# Patient Record
Sex: MALE | Race: BLACK OR AFRICAN AMERICAN | NOT HISPANIC OR LATINO
[De-identification: names, ages, dates, MRNs, and addresses within clinical notes are randomized per-mention and may not be internally consistent; named-entity substitution may affect disease eponyms.]

---

## 2017-02-17 ENCOUNTER — RECORD ABSTRACTING (OUTPATIENT)
Age: 57
End: 2017-02-17

## 2017-02-17 DIAGNOSIS — Z83.49 FAMILY HISTORY OF OTHER ENDOCRINE, NUTRITIONAL AND METABOLIC DISEASES: ICD-10-CM

## 2017-02-17 DIAGNOSIS — Z86.59 PERSONAL HISTORY OF OTHER MENTAL AND BEHAVIORAL DISORDERS: ICD-10-CM

## 2017-02-17 DIAGNOSIS — Z87.898 PERSONAL HISTORY OF OTHER SPECIFIED CONDITIONS: ICD-10-CM

## 2017-02-17 DIAGNOSIS — Z82.49 FAMILY HISTORY OF ISCHEMIC HEART DISEASE AND OTHER DISEASES OF THE CIRCULATORY SYSTEM: ICD-10-CM

## 2017-02-17 DIAGNOSIS — Z82.0 FAMILY HISTORY OF EPILEPSY AND OTHER DISEASES OF THE NERVOUS SYSTEM: ICD-10-CM

## 2017-02-17 DIAGNOSIS — Z83.3 FAMILY HISTORY OF DIABETES MELLITUS: ICD-10-CM

## 2017-02-17 DIAGNOSIS — Z86.2 PERSONAL HISTORY OF DISEASES OF THE BLOOD AND BLOOD-FORMING ORGANS AND CERTAIN DISORDERS INVOLVING THE IMMUNE MECHANISM: ICD-10-CM

## 2017-02-17 DIAGNOSIS — F43.10 POST-TRAUMATIC STRESS DISORDER, UNSPECIFIED: ICD-10-CM

## 2017-03-10 PROBLEM — Z00.00 ENCOUNTER FOR PREVENTIVE HEALTH EXAMINATION: Noted: 2017-03-10

## 2017-04-05 ENCOUNTER — APPOINTMENT (OUTPATIENT)
Dept: GASTROENTEROLOGY | Facility: CLINIC | Age: 57
End: 2017-04-05

## 2017-08-30 ENCOUNTER — APPOINTMENT (OUTPATIENT)
Dept: GASTROENTEROLOGY | Facility: CLINIC | Age: 57
End: 2017-08-30
Payer: MEDICAID

## 2017-08-30 VITALS
HEIGHT: 71 IN | WEIGHT: 185 LBS | RESPIRATION RATE: 14 BRPM | DIASTOLIC BLOOD PRESSURE: 98 MMHG | SYSTOLIC BLOOD PRESSURE: 229 MMHG | OXYGEN SATURATION: 98 % | HEART RATE: 78 BPM | BODY MASS INDEX: 25.9 KG/M2

## 2017-08-30 DIAGNOSIS — K43.9 VENTRAL HERNIA W/OUT OBSTRUCTION OR GANGRENE: ICD-10-CM

## 2017-08-30 DIAGNOSIS — Z86.711 PERSONAL HISTORY OF PULMONARY EMBOLISM: ICD-10-CM

## 2017-08-30 DIAGNOSIS — Z86.59 PERSONAL HISTORY OF OTHER MENTAL AND BEHAVIORAL DISORDERS: ICD-10-CM

## 2017-08-30 DIAGNOSIS — Z87.898 PERSONAL HISTORY OF OTHER SPECIFIED CONDITIONS: ICD-10-CM

## 2017-08-30 DIAGNOSIS — R07.89 OTHER CHEST PAIN: ICD-10-CM

## 2017-08-30 DIAGNOSIS — Z86.2 PERSONAL HISTORY OF DISEASES OF THE BLOOD AND BLOOD-FORMING ORGANS AND CERTAIN DISORDERS INVOLVING THE IMMUNE MECHANISM: ICD-10-CM

## 2017-08-30 DIAGNOSIS — Z87.438 PERSONAL HISTORY OF OTHER DISEASES OF MALE GENITAL ORGANS: ICD-10-CM

## 2017-08-30 PROCEDURE — 99214 OFFICE O/P EST MOD 30 MIN: CPT

## 2017-11-28 ENCOUNTER — APPOINTMENT (OUTPATIENT)
Dept: GASTROENTEROLOGY | Facility: GI CENTER | Age: 57
End: 2017-11-28

## 2018-04-25 ENCOUNTER — APPOINTMENT (OUTPATIENT)
Dept: GASTROENTEROLOGY | Facility: CLINIC | Age: 58
End: 2018-04-25

## 2018-06-26 ENCOUNTER — APPOINTMENT (OUTPATIENT)
Dept: GASTROENTEROLOGY | Facility: CLINIC | Age: 58
End: 2018-06-26
Payer: MEDICAID

## 2018-06-26 VITALS
DIASTOLIC BLOOD PRESSURE: 88 MMHG | RESPIRATION RATE: 16 BRPM | HEART RATE: 79 BPM | WEIGHT: 190 LBS | BODY MASS INDEX: 26.6 KG/M2 | SYSTOLIC BLOOD PRESSURE: 180 MMHG | HEIGHT: 71 IN | OXYGEN SATURATION: 99 %

## 2018-06-26 PROCEDURE — 99214 OFFICE O/P EST MOD 30 MIN: CPT

## 2018-06-26 RX ORDER — CIPROFLOXACIN HYDROCHLORIDE 500 MG/1
500 TABLET, FILM COATED ORAL
Qty: 20 | Refills: 0 | Status: COMPLETED | COMMUNITY
Start: 2018-06-12

## 2018-06-27 ENCOUNTER — LABORATORY RESULT (OUTPATIENT)
Age: 58
End: 2018-06-27

## 2018-06-27 ENCOUNTER — MEDICATION RENEWAL (OUTPATIENT)
Age: 58
End: 2018-06-27

## 2018-06-28 LAB
ALBUMIN SERPL ELPH-MCNC: 4.1 G/DL
ALP BLD-CCNC: 86 U/L
ALT SERPL-CCNC: 20 U/L
ANION GAP SERPL CALC-SCNC: 12 MMOL/L
AST SERPL-CCNC: 18 U/L
BASOPHILS # BLD AUTO: 0.1 K/UL
BASOPHILS NFR BLD AUTO: 2.7 %
BILIRUB SERPL-MCNC: 0.6 MG/DL
BUN SERPL-MCNC: 5 MG/DL
CALCIUM SERPL-MCNC: 8.7 MG/DL
CHLORIDE SERPL-SCNC: 105 MMOL/L
CO2 SERPL-SCNC: 25 MMOL/L
CREAT SERPL-MCNC: 0.97 MG/DL
EOSINOPHIL # BLD AUTO: 0.12 K/UL
EOSINOPHIL NFR BLD AUTO: 3.5 %
GLIADIN IGA SER QL: 5.8 UNITS
GLIADIN IGG SER QL: <5 UNITS
GLIADIN PEPTIDE IGA SER-ACNC: NEGATIVE
GLIADIN PEPTIDE IGG SER-ACNC: NEGATIVE
GLUCOSE SERPL-MCNC: 87 MG/DL
HCT VFR BLD CALC: 36.2 %
HGB BLD-MCNC: 11.8 G/DL
IGA SER QL IEP: 224 MG/DL
INR PPP: 1.09 RATIO
LYMPHOCYTES # BLD AUTO: 1.66 K/UL
LYMPHOCYTES NFR BLD AUTO: 46.9 %
MAN DIFF?: NORMAL
MCHC RBC-ENTMCNC: 22.9 PG
MCHC RBC-ENTMCNC: 32.6 GM/DL
MCV RBC AUTO: 70.2 FL
MONOCYTES # BLD AUTO: 0.28 K/UL
MONOCYTES NFR BLD AUTO: 8 %
NEUTROPHILS # BLD AUTO: 1.38 K/UL
NEUTROPHILS NFR BLD AUTO: 38.9 %
PLATELET # BLD AUTO: 160 K/UL
POTASSIUM SERPL-SCNC: 4.2 MMOL/L
PROT SERPL-MCNC: 6.5 G/DL
PT BLD: 12.3 SEC
RBC # BLD: 5.16 M/UL
RBC # FLD: 16.1 %
SODIUM SERPL-SCNC: 142 MMOL/L
TTG IGA SER IA-ACNC: 5.8 UNITS
TTG IGA SER-ACNC: NEGATIVE
TTG IGG SER IA-ACNC: <5 UNITS
TTG IGG SER IA-ACNC: NEGATIVE
WBC # FLD AUTO: 3.55 K/UL

## 2018-06-29 ENCOUNTER — OUTPATIENT (OUTPATIENT)
Dept: OUTPATIENT SERVICES | Facility: HOSPITAL | Age: 58
LOS: 1 days | End: 2018-06-29
Payer: COMMERCIAL

## 2018-06-29 ENCOUNTER — RESULT REVIEW (OUTPATIENT)
Age: 58
End: 2018-06-29

## 2018-06-29 ENCOUNTER — APPOINTMENT (OUTPATIENT)
Dept: GASTROENTEROLOGY | Facility: GI CENTER | Age: 58
End: 2018-06-29
Payer: MEDICAID

## 2018-06-29 DIAGNOSIS — R07.89 OTHER CHEST PAIN: ICD-10-CM

## 2018-06-29 DIAGNOSIS — S02.609A FRACTURE OF MANDIBLE, UNSPECIFIED, INITIAL ENCOUNTER FOR CLOSED FRACTURE: Chronic | ICD-10-CM

## 2018-06-29 DIAGNOSIS — Z98.89 OTHER SPECIFIED POSTPROCEDURAL STATES: Chronic | ICD-10-CM

## 2018-06-29 LAB
ENDOMYSIUM IGA SER QL: NEGATIVE
ENDOMYSIUM IGA TITR SER: NORMAL

## 2018-06-29 PROCEDURE — 88342 IMHCHEM/IMCYTCHM 1ST ANTB: CPT | Mod: 26

## 2018-06-29 PROCEDURE — 43239 EGD BIOPSY SINGLE/MULTIPLE: CPT

## 2018-06-29 PROCEDURE — 88305 TISSUE EXAM BY PATHOLOGIST: CPT | Mod: 26

## 2018-06-29 PROCEDURE — 88342 IMHCHEM/IMCYTCHM 1ST ANTB: CPT

## 2018-06-29 PROCEDURE — 88305 TISSUE EXAM BY PATHOLOGIST: CPT

## 2018-07-06 LAB — SURGICAL PATHOLOGY FINAL REPORT - CH: SIGNIFICANT CHANGE UP

## 2018-07-11 ENCOUNTER — APPOINTMENT (OUTPATIENT)
Dept: GASTROENTEROLOGY | Facility: GI CENTER | Age: 58
End: 2018-07-11
Payer: MEDICAID

## 2018-07-11 ENCOUNTER — OUTPATIENT (OUTPATIENT)
Dept: OUTPATIENT SERVICES | Facility: HOSPITAL | Age: 58
LOS: 1 days | End: 2018-07-11
Payer: COMMERCIAL

## 2018-07-11 DIAGNOSIS — Z12.11 ENCOUNTER FOR SCREENING FOR MALIGNANT NEOPLASM OF COLON: ICD-10-CM

## 2018-07-11 DIAGNOSIS — G89.29 LEFT UPPER QUADRANT PAIN: ICD-10-CM

## 2018-07-11 DIAGNOSIS — Z98.89 OTHER SPECIFIED POSTPROCEDURAL STATES: Chronic | ICD-10-CM

## 2018-07-11 DIAGNOSIS — S02.609A FRACTURE OF MANDIBLE, UNSPECIFIED, INITIAL ENCOUNTER FOR CLOSED FRACTURE: Chronic | ICD-10-CM

## 2018-07-11 DIAGNOSIS — R10.12 LEFT UPPER QUADRANT PAIN: ICD-10-CM

## 2018-07-11 PROCEDURE — G0121: CPT

## 2018-07-11 PROCEDURE — 45378 DIAGNOSTIC COLONOSCOPY: CPT | Mod: 33

## 2019-04-20 ENCOUNTER — EMERGENCY (EMERGENCY)
Facility: HOSPITAL | Age: 59
LOS: 1 days | Discharge: HOME | End: 2019-04-20
Attending: EMERGENCY MEDICINE
Payer: COMMERCIAL

## 2019-04-20 VITALS
WEIGHT: 179.9 LBS | HEART RATE: 73 BPM | RESPIRATION RATE: 18 BRPM | OXYGEN SATURATION: 98 % | HEIGHT: 71 IN | DIASTOLIC BLOOD PRESSURE: 80 MMHG | TEMPERATURE: 99 F | SYSTOLIC BLOOD PRESSURE: 131 MMHG

## 2019-04-20 DIAGNOSIS — S02.609A FRACTURE OF MANDIBLE, UNSPECIFIED, INITIAL ENCOUNTER FOR CLOSED FRACTURE: Chronic | ICD-10-CM

## 2019-04-20 DIAGNOSIS — Z98.89 OTHER SPECIFIED POSTPROCEDURAL STATES: Chronic | ICD-10-CM

## 2019-04-20 PROCEDURE — 73562 X-RAY EXAM OF KNEE 3: CPT | Mod: 26,RT

## 2019-04-20 PROCEDURE — 73562 X-RAY EXAM OF KNEE 3: CPT

## 2019-04-20 PROCEDURE — 99283 EMERGENCY DEPT VISIT LOW MDM: CPT

## 2019-04-20 RX ORDER — IBUPROFEN 200 MG
1 TABLET ORAL
Qty: 15 | Refills: 0
Start: 2019-04-20 | End: 2019-04-24

## 2019-04-20 RX ORDER — IBUPROFEN 200 MG
600 TABLET ORAL ONCE
Qty: 0 | Refills: 0 | Status: COMPLETED | OUTPATIENT
Start: 2019-04-20 | End: 2019-04-20

## 2019-04-20 RX ADMIN — Medication 600 MILLIGRAM(S): at 09:02
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
## 2019-04-20 NOTE — ED STATDOCS - PHYSICAL EXAMINATION
Musculoskeletal:  No swelling or deformity of right knee, tenderness to lateral aspect on palpation, no ligamentous laxity or crepitus
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
## 2020-06-29 ENCOUNTER — EMERGENCY (EMERGENCY)
Facility: HOSPITAL | Age: 60
LOS: 1 days | Discharge: HOME | End: 2020-06-29
Attending: EMERGENCY MEDICINE
Payer: COMMERCIAL

## 2020-06-29 VITALS
DIASTOLIC BLOOD PRESSURE: 90 MMHG | HEART RATE: 76 BPM | SYSTOLIC BLOOD PRESSURE: 135 MMHG | RESPIRATION RATE: 18 BRPM | OXYGEN SATURATION: 100 %

## 2020-06-29 VITALS
HEIGHT: 71 IN | TEMPERATURE: 98 F | WEIGHT: 184.97 LBS | SYSTOLIC BLOOD PRESSURE: 119 MMHG | DIASTOLIC BLOOD PRESSURE: 75 MMHG | HEART RATE: 80 BPM | OXYGEN SATURATION: 98 % | RESPIRATION RATE: 18 BRPM

## 2020-06-29 DIAGNOSIS — Z98.89 OTHER SPECIFIED POSTPROCEDURAL STATES: Chronic | ICD-10-CM

## 2020-06-29 DIAGNOSIS — S02.609A FRACTURE OF MANDIBLE, UNSPECIFIED, INITIAL ENCOUNTER FOR CLOSED FRACTURE: Chronic | ICD-10-CM

## 2020-06-29 LAB
ALBUMIN SERPL ELPH-MCNC: 4.4 G/DL — SIGNIFICANT CHANGE UP (ref 3.3–5.2)
ALP SERPL-CCNC: 72 U/L — SIGNIFICANT CHANGE UP (ref 40–120)
ALT FLD-CCNC: 22 U/L — SIGNIFICANT CHANGE UP
ANION GAP SERPL CALC-SCNC: 10 MMOL/L — SIGNIFICANT CHANGE UP (ref 5–17)
ANISOCYTOSIS BLD QL: SLIGHT — SIGNIFICANT CHANGE UP
AST SERPL-CCNC: 18 U/L — SIGNIFICANT CHANGE UP
BASOPHILS # BLD AUTO: 0.04 K/UL — SIGNIFICANT CHANGE UP (ref 0–0.2)
BASOPHILS NFR BLD AUTO: 0.8 % — SIGNIFICANT CHANGE UP (ref 0–2)
BILIRUB SERPL-MCNC: 0.9 MG/DL — SIGNIFICANT CHANGE UP (ref 0.4–2)
BUN SERPL-MCNC: 12 MG/DL — SIGNIFICANT CHANGE UP (ref 8–20)
CALCIUM SERPL-MCNC: 9.2 MG/DL — SIGNIFICANT CHANGE UP (ref 8.6–10.2)
CHLORIDE SERPL-SCNC: 102 MMOL/L — SIGNIFICANT CHANGE UP (ref 98–107)
CO2 SERPL-SCNC: 28 MMOL/L — SIGNIFICANT CHANGE UP (ref 22–29)
CREAT SERPL-MCNC: 0.93 MG/DL — SIGNIFICANT CHANGE UP (ref 0.5–1.3)
CRP SERPL-MCNC: 0.49 MG/DL — HIGH (ref 0–0.4)
EOSINOPHIL # BLD AUTO: 0.5 K/UL — SIGNIFICANT CHANGE UP (ref 0–0.5)
EOSINOPHIL NFR BLD AUTO: 10.4 % — HIGH (ref 0–6)
GLUCOSE SERPL-MCNC: 90 MG/DL — SIGNIFICANT CHANGE UP (ref 70–99)
HCT VFR BLD CALC: 41.5 % — SIGNIFICANT CHANGE UP (ref 39–50)
HGB BLD-MCNC: 12.9 G/DL — LOW (ref 13–17)
HYPOCHROMIA BLD QL: SLIGHT — SIGNIFICANT CHANGE UP
IMM GRANULOCYTES NFR BLD AUTO: 0.2 % — SIGNIFICANT CHANGE UP (ref 0–1.5)
LYMPHOCYTES # BLD AUTO: 0.98 K/UL — LOW (ref 1–3.3)
LYMPHOCYTES # BLD AUTO: 20.3 % — SIGNIFICANT CHANGE UP (ref 13–44)
MAGNESIUM SERPL-MCNC: 2 MG/DL — SIGNIFICANT CHANGE UP (ref 1.8–2.6)
MANUAL SMEAR VERIFICATION: SIGNIFICANT CHANGE UP
MCHC RBC-ENTMCNC: 23.3 PG — LOW (ref 27–34)
MCHC RBC-ENTMCNC: 31.1 GM/DL — LOW (ref 32–36)
MCV RBC AUTO: 74.9 FL — LOW (ref 80–100)
MICROCYTES BLD QL: SLIGHT — SIGNIFICANT CHANGE UP
MONOCYTES # BLD AUTO: 0.48 K/UL — SIGNIFICANT CHANGE UP (ref 0–0.9)
MONOCYTES NFR BLD AUTO: 10 % — SIGNIFICANT CHANGE UP (ref 2–14)
NEUTROPHILS # BLD AUTO: 2.81 K/UL — SIGNIFICANT CHANGE UP (ref 1.8–7.4)
NEUTROPHILS NFR BLD AUTO: 58.3 % — SIGNIFICANT CHANGE UP (ref 43–77)
OVALOCYTES BLD QL SMEAR: SLIGHT — SIGNIFICANT CHANGE UP
PHOSPHATE SERPL-MCNC: 3.7 MG/DL — SIGNIFICANT CHANGE UP (ref 2.4–4.7)
PLAT MORPH BLD: NORMAL — SIGNIFICANT CHANGE UP
PLATELET # BLD AUTO: 155 K/UL — SIGNIFICANT CHANGE UP (ref 150–400)
POIKILOCYTOSIS BLD QL AUTO: SLIGHT — SIGNIFICANT CHANGE UP
POLYCHROMASIA BLD QL SMEAR: SLIGHT — SIGNIFICANT CHANGE UP
POTASSIUM SERPL-MCNC: 4.2 MMOL/L — SIGNIFICANT CHANGE UP (ref 3.5–5.3)
POTASSIUM SERPL-SCNC: 4.2 MMOL/L — SIGNIFICANT CHANGE UP (ref 3.5–5.3)
PROT SERPL-MCNC: 7.7 G/DL — SIGNIFICANT CHANGE UP (ref 6.6–8.7)
RBC # BLD: 5.54 M/UL — SIGNIFICANT CHANGE UP (ref 4.2–5.8)
RBC # FLD: 16.5 % — HIGH (ref 10.3–14.5)
RBC BLD AUTO: ABNORMAL
SCHISTOCYTES BLD QL AUTO: SLIGHT — SIGNIFICANT CHANGE UP
SODIUM SERPL-SCNC: 140 MMOL/L — SIGNIFICANT CHANGE UP (ref 135–145)
T3 SERPL-MCNC: 117 NG/DL — SIGNIFICANT CHANGE UP (ref 80–200)
T4 AB SER-ACNC: 7.9 UG/DL — SIGNIFICANT CHANGE UP (ref 4.5–12)
TSH SERPL-MCNC: 1.15 UIU/ML — SIGNIFICANT CHANGE UP (ref 0.27–4.2)
WBC # BLD: 4.82 K/UL — SIGNIFICANT CHANGE UP (ref 3.8–10.5)
WBC # FLD AUTO: 4.82 K/UL — SIGNIFICANT CHANGE UP (ref 3.8–10.5)

## 2020-06-29 PROCEDURE — 86618 LYME DISEASE ANTIBODY: CPT

## 2020-06-29 PROCEDURE — 80053 COMPREHEN METABOLIC PANEL: CPT

## 2020-06-29 PROCEDURE — 86140 C-REACTIVE PROTEIN: CPT

## 2020-06-29 PROCEDURE — 86592 SYPHILIS TEST NON-TREP QUAL: CPT

## 2020-06-29 PROCEDURE — 87798 DETECT AGENT NOS DNA AMP: CPT

## 2020-06-29 PROCEDURE — 74177 CT ABD & PELVIS W/CONTRAST: CPT

## 2020-06-29 PROCEDURE — 86780 TREPONEMA PALLIDUM: CPT

## 2020-06-29 PROCEDURE — 94640 AIRWAY INHALATION TREATMENT: CPT

## 2020-06-29 PROCEDURE — 71260 CT THORAX DX C+: CPT

## 2020-06-29 PROCEDURE — 74177 CT ABD & PELVIS W/CONTRAST: CPT | Mod: 26

## 2020-06-29 PROCEDURE — 99284 EMERGENCY DEPT VISIT MOD MDM: CPT | Mod: 25

## 2020-06-29 PROCEDURE — U0003: CPT

## 2020-06-29 PROCEDURE — 84100 ASSAY OF PHOSPHORUS: CPT

## 2020-06-29 PROCEDURE — 84443 ASSAY THYROID STIM HORMONE: CPT

## 2020-06-29 PROCEDURE — 84436 ASSAY OF TOTAL THYROXINE: CPT

## 2020-06-29 PROCEDURE — 86038 ANTINUCLEAR ANTIBODIES: CPT

## 2020-06-29 PROCEDURE — 83735 ASSAY OF MAGNESIUM: CPT

## 2020-06-29 PROCEDURE — 85027 COMPLETE CBC AUTOMATED: CPT

## 2020-06-29 PROCEDURE — 36415 COLL VENOUS BLD VENIPUNCTURE: CPT

## 2020-06-29 PROCEDURE — 71260 CT THORAX DX C+: CPT | Mod: 26

## 2020-06-29 PROCEDURE — 84480 ASSAY TRIIODOTHYRONINE (T3): CPT

## 2020-06-29 PROCEDURE — 99285 EMERGENCY DEPT VISIT HI MDM: CPT

## 2020-06-29 RX ORDER — IPRATROPIUM/ALBUTEROL SULFATE 18-103MCG
3 AEROSOL WITH ADAPTER (GRAM) INHALATION ONCE
Refills: 0 | Status: COMPLETED | OUTPATIENT
Start: 2020-06-29 | End: 2020-06-29

## 2020-06-29 RX ADMIN — Medication 3 MILLILITER(S): at 14:36

## 2020-06-29 NOTE — ED PROVIDER NOTE - CARE PLAN
Principal Discharge DX:	Joint pain  Secondary Diagnosis:	Abdominal pain  Secondary Diagnosis:	Chest tightness

## 2020-06-29 NOTE — ED PROVIDER NOTE - CONSTITUTIONAL, MLM
normal... (+) ill appearing, having trouble processing thoughts. awake, alert, oriented to person, place, time/situation and in no apparent distress.

## 2020-06-29 NOTE — ED ADULT NURSE NOTE - OBJECTIVE STATEMENT
assumed care at 1430. 59 y/o male a&ox3 comes to ED c/o rib pain radiating to his back, and dull chest pain starting on Saturday. pt. states it worsens when he moves. pt. also states he has pain to his rt. middle digit starting yesterday, worsening today. radiating to his second digit. swelling noted to rt. hand. pt. unable to fully close his hand. pt. denies recent fall/injury. cocaine use last week
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
## 2020-06-29 NOTE — ED PROVIDER NOTE - ATTENDING CONTRIBUTION TO CARE
I, Ingrid Pillai, performed the initial face to face bedside interview with this patient regarding history of present illness, review of symptoms and relevant past medical, social and family history.  I completed an independent physical examination.  I was the initial provider who evaluated this patient. I have signed out the follow up of any pending tests (i.e. labs, radiological studies) to the ACP.  I have communicated the patient’s plan of care and disposition with the ACP.  The history, relevant review of systems, past medical and surgical history, medical decision making, and physical examination was documented by the scribe in my presence and I attest to the accuracy of the documentation.

## 2020-06-29 NOTE — ED PROVIDER NOTE - PATIENT PORTAL LINK FT
You can access the FollowMyHealth Patient Portal offered by [facility] by registering at the following website: [email or web address]. By joining [employer]’s FollowMyHealth portal, you will also be able to view your health information using other applications (apps) compatible with our system.

## 2020-06-29 NOTE — ED PROVIDER NOTE - SKIN, MLM
(+)Hyperpigmented spots on palms and soles of feet, (+) Digits are edematous. Skin normal color for race, warm, dry and intact.

## 2020-06-29 NOTE — ED PROVIDER NOTE - PROGRESS NOTE DETAILS
PT evaluated by intake physician. HPI/PE/ROS as noted above. Will follow up plan per intake physician. Pt also complaining of upper abd pain and ttp. Will order CT. Pt also admits to positive covid exposure. WIll send swab. Pt given all results. Pt told to f/u with PCP and to come back with new or worsening symptoms. COVID swabs obtained.  Advised home quarantine until test results.  If test positive, requires home quarantine for 14-days.  Anticipatory guidance provided and supportive care recommended. Advised immediate return if worsening symptoms, especially if short of breath. Pt given all results. Pt appears well. Pt told to f/u with PCP and to come back with new or worsening symptoms. PT evaluated by intake physician. HPI/PE/ROS as noted above. Will follow up plan per intake physician. Lungs clear. Pt also complaining of upper abd pain and ttp. Will order CT. Pt also admits to positive covid exposure. WIll send swab.

## 2020-06-29 NOTE — ED PROVIDER NOTE - NSFOLLOWUPINSTRUCTIONS_ED_ALL_ED_FT
Follow up with PCP.  Hospital will call with positive results.  Come back with new or worsening symptoms.    READ ALL ATTACHED INSTRUCTIONS FOR CORONAVIRUS IMMEDIATELY   -You were tested for COVID19 (Coronavirus) during your visit in the Emergency Department.   -Results will take 5-7 days  -Do NOT return to work/school/public areas until your COVID test results as negative.   -SELF QUARANTINE until COVID result is available.   -Avoid contact with others.   -Wash your hands frequently. Disinfect surfaces frequently    SEEK IMMEDIATE MEDICAL CARE IF YOU HAVE ANY OF THE FOLLOWING SYMPTOMS  **If you develop worsening or new symptoms such as shortness of breath, difficulty breathing, chest pain, confusion, severe weakness, or anything concerning to you, please seek immediate medical care or return to the ER .**

## 2020-06-30 LAB
B BURGDOR C6 AB SER-ACNC: POSITIVE
B BURGDOR IGG+IGM SER-ACNC: 2.15 INDEX — HIGH (ref 0.01–0.89)
CULTURE RESULTS: SIGNIFICANT CHANGE UP
SARS-COV-2 RNA SPEC QL NAA+PROBE: SIGNIFICANT CHANGE UP
SPECIMEN SOURCE: SIGNIFICANT CHANGE UP

## 2020-06-30 NOTE — ED POST DISCHARGE NOTE - DETAILS
+syphillis, letter sent spoke to patient will return for injection for treatment of syphillis, RX doxy+ for lyme, given phone number to duc CRAWFORD for follow up +BC babesosis, will need TX clinda and quinine, patient returning to ED

## 2020-07-01 LAB
RPR SERPL-ACNC: SIGNIFICANT CHANGE UP
T PALLIDUM AB TITR SER: POSITIVE
T PALLIDUM IGG SER QL IF: SIGNIFICANT CHANGE UP

## 2020-07-03 LAB — ANA TITR SER: NEGATIVE — SIGNIFICANT CHANGE UP

## 2020-07-06 ENCOUNTER — APPOINTMENT (OUTPATIENT)
Dept: FAMILY MEDICINE | Facility: CLINIC | Age: 60
End: 2020-07-06

## 2020-07-10 ENCOUNTER — EMERGENCY (EMERGENCY)
Facility: HOSPITAL | Age: 60
LOS: 1 days | Discharge: HOME | End: 2020-07-10
Attending: EMERGENCY MEDICINE
Payer: COMMERCIAL

## 2020-07-10 VITALS
DIASTOLIC BLOOD PRESSURE: 76 MMHG | OXYGEN SATURATION: 99 % | SYSTOLIC BLOOD PRESSURE: 124 MMHG | HEART RATE: 72 BPM | TEMPERATURE: 98 F | RESPIRATION RATE: 18 BRPM

## 2020-07-10 DIAGNOSIS — Z98.89 OTHER SPECIFIED POSTPROCEDURAL STATES: Chronic | ICD-10-CM

## 2020-07-10 DIAGNOSIS — S02.609A FRACTURE OF MANDIBLE, UNSPECIFIED, INITIAL ENCOUNTER FOR CLOSED FRACTURE: Chronic | ICD-10-CM

## 2020-07-10 PROCEDURE — 99283 EMERGENCY DEPT VISIT LOW MDM: CPT

## 2020-07-10 PROCEDURE — 99284 EMERGENCY DEPT VISIT MOD MDM: CPT

## 2020-07-10 RX ORDER — LACTOBACILLUS ACIDOPHILUS 100MM CELL
2 CAPSULE ORAL
Qty: 60 | Refills: 0
Start: 2020-07-10 | End: 2020-08-08

## 2020-07-10 RX ORDER — QUININE SULFATE 324 MG/1
2 CAPSULE ORAL
Qty: 60 | Refills: 0
Start: 2020-07-10 | End: 2020-07-19

## 2020-07-10 RX ORDER — PENICILLIN G BENZATHINE 1200000 [IU]/2ML
2.4 INJECTION, SUSPENSION INTRAMUSCULAR ONCE
Refills: 0 | Status: COMPLETED | OUTPATIENT
Start: 2020-07-10 | End: 2020-07-10

## 2020-07-10 RX ADMIN — PENICILLIN G BENZATHINE 2.4 MILLION UNIT(S): 1200000 INJECTION, SUSPENSION INTRAMUSCULAR at 15:09

## 2020-07-10 NOTE — ED PROVIDER NOTE - NS ED ROS FT
Constitutional: no fever, no chills, +fatigue  Eyes: no vision changes  ENT: no nasal congestion, no sore throat  CV: no chest pain  Resp: no cough, no shortness of breath  GI: no abdominal pain  : no dysuria  MSK: +joint pain  Skin: no rash  Neuro: no headache

## 2020-07-10 NOTE — ED PROVIDER NOTE - CLINICAL SUMMARY MEDICAL DECISION MAKING FREE TEXT BOX
60y M presenting for follow-up after testing positive for Lyme disease.  Rx for doxycycline sent.  Pt also with positive treponema Ab test, but negative RPR and FTA confirmatory test.  Unlikely to have active syphilis given test results, but will treat with Penicillin G IM.  Pt already has f/u appt scheduled with ID.

## 2020-07-10 NOTE — ED PROVIDER NOTE - OBJECTIVE STATEMENT
60y M w/ hx arthritis, enlarged prostate, pre-diabetic, PTSD, and thalassemia minor, presenting for follow-up of abnormal test result.  Pt was evaluated in this ED on 6/29, and tested positive for Lyme disease.  Also had positive Treponema antibody test, but negative RPR and FTA syphilis confirmatory test.  (Contrary to prior documentation, pt tested negative for babesiosis).  Denies any new complaints since last visit.  Has appt scheduled with Dr. Brito of ID on 7/21.  Notes that he has hx of STDs including chlamydia, and was treated with penicillin IM many years ago, though he is not sure why.

## 2020-07-10 NOTE — ED PROVIDER NOTE - ATTENDING CONTRIBUTION TO CARE
pt is call back for lyme disease and possibility of syphilis (although in reviewing lab results, unlikley to have active syphilis).  no new complaints  Pe lungs cta  abd soft plan abx

## 2020-07-10 NOTE — ED PROVIDER NOTE - CARE PROVIDER_API CALL
ELIZABETH DAHL  Infectious Diseases  [address] [place] [address]  Phone: [phone]  Fax: [phone]  Follow Up Time:

## 2020-07-10 NOTE — ED PROVIDER NOTE - NSFOLLOWUPINSTRUCTIONS_ED_ALL_ED_FT
TAKE ANTIBIOTIC (DOXYCYCLINE) AS PRESCRIBED.  FOLLOW-UP WITH INFECTIOUS DISEASE.  RETURN TO THE EMERGENCY ROOM FOR NEW OR WORSENING SYMPTOMS.    ---------------------------    Lyme Disease  Lyme disease is an infection that can affect many parts of the body, including the skin, joints, and nervous system. It is a bacterial infection that starts from the bite of an infected tick. Over time, the infection can worsen, and some of the symptoms are similar to the flu. If Lyme disease is not treated, it may cause joint pain, swelling, numbness, problems thinking, fatigue, muscle weakness, and other problems.  What are the causes?  This condition is caused by bacteria called Borrelia burgdorferi.  You can get Lyme disease by being bitten by an infected tick.Only black-legged, or Ixodes, ticks that are infected with the bacteria can cause Lyme disease.The tick must be attached to your skin for a certain period of time to pass along the infection. This is usually 36–48 hours.Deer often carry infected ticks.What increases the risk?  The following factors may make you more likely to develop this condition:  Living in or visiting these areas in the [place]  [place].Spending time in wooded or grassy areas.Being outdoors with exposed skin.Camping, gardening, hiking, fishing, hunting, or working outdoors.Failing to remove a tick from your skin.What are the signs or symptoms?  Symptoms of this condition may include:  Chills and fever.Headache.Fatigue.General achiness.Muscle pain.Joint pain, often in the knees.A round, red rash that surrounds the center of the tick bite. The center of the rash may be blood colored or have tiny blisters.Swollen lymph glands.Stiff neck.How is this diagnosed?  This condition is diagnosed based on:  Your symptoms and medical history.A physical exam.A blood test.How is this treated?  The main treatment for this condition is antibiotic medicine, which is usually taken by mouth (orally).  The length of treatment depends on how soon after a tick bite you begin taking the medicine. In some cases, treatment is necessary for several weeks.If the infection is severe, antibiotics may need to be given through an IV that is inserted into one of your veins.Follow these instructions at home:  Take over-the-counter and prescription medicines only as told by your health care provider. Finish all antibiotic medicine, even when you start to feel better.Ask your health care provider about taking a probiotic in between doses of your antibiotic to help avoid an upset stomach or diarrhea.Check with your health care provider before supplementing your treatment. Many alternative therapies have not been proven and may be harmful to you.Keep all follow-up visits as told by your health care provider. This is important.How is this prevented?  You can become reinfected if you get another tick bite from an infected tick. Take these steps to help prevent an infection:  Cover your skin with light-colored clothing when you are outdoors in the spring and summer months.Spray clothing and skin with bug spray. The spray should be 20–30% DEET. You can also treat clothing with permethrin, and let it dry before you wear it. Do not apply permethrin directly to your skin. Permethrin can also be used to treat camping gear and boots. Always read and follow the instructions that come with a bug spray or insecticide.Avoid wooded, grassy, and shaded areas.Remove yard litter, brush, trash, and plants that attract deer and rodents.Check yourself for ticks when you come indoors.Wash clothing worn each day.Shower after spending time outdoors.Check your pets for ticks before they come inside.If you find a tick attached to your skin:  Remove it with tweezers.Clean your hands and the bite area with rubbing alcohol or soap and water.Dispose of the tick by putting it in rubbing alcohol, putting it in a sealed bag or container, or flushing it down the toilet.You may choose to save the tick in a sealed container if you wish for it to be tested at a later time.Pregnant women should take special care to avoid tick bites because it is possible that the infection may be passed along to the fetus.  Contact a health care provider if:  You have symptoms after treatment.You have removed a tick and want to bring it to your health care provider for testing.Get help right away if:  You have an irregular heartbeat.You have chest pain.You have nerve pain.Your face feels numb.You develop the following:  A stiff neck.A severe headache.Severe nausea and vomiting.Sensitivity to light.Summary  Lyme disease is an infection that can affect many parts of the body, including the skin, joints, and nervous system.This condition is caused by bacteria called Borrelia burgdorferi.You can get Lyme disease by being bitten by an infected tick.The main treatment for this condition is antibiotic medicine.This information is not intended to replace advice given to you by your health care provider. Make sure you discuss any questions you have with your health care provider.    -----------------------------    Syphilis Test  Why am I having this test?  The syphilis test is used to diagnose syphilis and to help monitor syphilis treatment. Syphilis is a bacterial infection that commonly spreads through sexual contact. Syphilis may also spread to an unborn baby (fetus) through the blood of the mother.  You may have this test if you have symptoms of syphilis, such as a painless sore (chancre). Symptoms often look like the symptoms of many other conditions. As syphilis gets worse, early symptoms may go away before new symptoms develop. Untreated syphilis (late-stage syphilis) can lead to severe complications, including damage to the heart, brain, and nervous system.  Pregnant women often have a syphilis test. You may also have this test if you are at risk for syphilis because of:  Having a sexual partner with syphilis.Engaging in high-risk sexual activity.Having another STI (sexually transmitted infection), such as gonorrhea.What is being tested?  This test checks your blood for antibodies to the bacteria that cause syphilis. Antibodies are proteins that your body makes in response to germs and other things that can make you sick.  There are two types of blood tests to check for syphilis. Both tests are necessary to make a diagnosis:  Nontreponemal test. This is usually the first test. It can detect other kinds of antibodies as well and may result in a positive result for syphilis even if you do not have the condition (false positive).Treponemal test. This test is done if you get a positive result to the nontreponemal test. It tests specifically for antibodies to syphilis. Other conditions are not likely to cause a false positive. This test will not show whether antibodies are from a past syphilis infection or a current infection.What kind of sample is taken?     A blood sample is required for this test. It is usually collected by inserting a needle into a blood vessel.  If your health care provider thinks that you may have late-stage syphilis, you may have a lumbar puncture. This is a procedure in which a small sample of the fluid that surrounds the brain and spinal cord (cerebrospinal fluid or CSF) is removed to be examined for syphilis.  How do I prepare for this test?  Do not eat or drink anything other than water starting 8 hours before the test, or as told by your health care provider.Do not drink alcohol starting 24 hours before the test.Tell a health care provider about any medical conditions you have.How are the results reported?  Your results will be reported as positive or negative for syphilis antibodies.  What do the results mean?  If the result of your syphilis test is negative, no antibodies were present at the time of the test. This could mean:  You do not have syphilis.The antibodies have not formed yet. Antibodies can take several weeks to form. If it is possible that you were recently exposed to syphilis, you may need to have the test again at a later time.If you test positive for syphilis on the first nontreponemal test, you will most likely have the second treponemal test to confirm the diagnosis. If the result of the second test is also positive, it is likely that you have syphilis. If the result of the second test is negative, you may need more tests to make sure that you do not have syphilis.  Talk with your health care provider about what your results mean.  Questions to ask your health care provider  Ask your health care provider, or the department that is doing the test:  When will my results be ready?How will I get my results?What are my treatment options?What other tests do I need?What are my next steps?Summary  The syphilis test is used to diagnose syphilis and to help monitor syphilis treatment. This test checks your blood for antibodies to the bacteria that cause syphilis.There are two types of blood tests to check for syphilis. Both tests are necessary to make a diagnosis.Talk with your health care provider about what your results mean.This information is not intended to replace advice given to you by your health care provider. Make sure you discuss any questions you have with your health care provider.

## 2020-07-10 NOTE — ED PROVIDER NOTE - PHYSICAL EXAMINATION
Constitutional: Awake, alert, in no acute distress  Eyes: no scleral icterus  HENT: normocephalic, atraumatic, moist oral mucosa  CV: RRR, no murmur  Pulm: non-labored respirations, CTAB  Abdomen: soft, non-tender, non-distended  Extremities: no edema, no deformity  Skin: no rash, no jaundice  Neuro: AAOx3, moving all extremities equally

## 2020-07-10 NOTE — ED PROVIDER NOTE - PATIENT PORTAL LINK FT
You can access the FollowMyHealth Patient Portal offered by [facility] by registering at the following website: [email or web address]. By joining [employer]’s FollowMyHealth portal, you will also be able to view your health information using other applications (apps) compatible with our system.

## 2020-07-21 ENCOUNTER — APPOINTMENT (OUTPATIENT)
Dept: INTERNAL MEDICINE | Facility: CLINIC | Age: 60
End: 2020-07-21
Payer: MEDICAID

## 2020-07-21 VITALS
TEMPERATURE: 98 F | BODY MASS INDEX: 26.6 KG/M2 | WEIGHT: 190 LBS | HEIGHT: 71 IN | HEART RATE: 76 BPM | RESPIRATION RATE: 16 BRPM | SYSTOLIC BLOOD PRESSURE: 112 MMHG | DIASTOLIC BLOOD PRESSURE: 78 MMHG

## 2020-07-21 DIAGNOSIS — R76.8 OTHER SPECIFIED ABNORMAL IMMUNOLOGICAL FINDINGS IN SERUM: ICD-10-CM

## 2020-07-21 DIAGNOSIS — Z87.898 PERSONAL HISTORY OF OTHER SPECIFIED CONDITIONS: ICD-10-CM

## 2020-07-21 PROCEDURE — 36415 COLL VENOUS BLD VENIPUNCTURE: CPT

## 2020-07-21 PROCEDURE — 99204 OFFICE O/P NEW MOD 45 MIN: CPT | Mod: 25

## 2020-07-21 RX ORDER — POLYETHYLENE GLYOCOL 3350, SODIUM CHLORIDE, SODIUM BICARBONATE AND POTASSIUM CHLORIDE 420; 11.2; 5.72; 1.48 G/4L; G/4L; G/4L; G/4L
420 POWDER, FOR SOLUTION NASOGASTRIC; ORAL
Qty: 1 | Refills: 0 | Status: DISCONTINUED | COMMUNITY
Start: 2018-06-26 | End: 2020-07-21

## 2020-07-21 RX ORDER — LAMOTRIGINE 100 MG/1
100 TABLET ORAL
Qty: 30 | Refills: 0 | Status: DISCONTINUED | COMMUNITY
Start: 2017-06-09 | End: 2020-07-21

## 2020-07-21 RX ORDER — POLYETHYLENE GLYOCOL 3350, SODIUM CHLORIDE, SODIUM BICARBONATE AND POTASSIUM CHLORIDE 420; 11.2; 5.72; 1.48 G/4L; G/4L; G/4L; G/4L
420 POWDER, FOR SOLUTION NASOGASTRIC; ORAL
Qty: 1 | Refills: 0 | Status: DISCONTINUED | COMMUNITY
Start: 2018-06-27 | End: 2020-07-21

## 2020-07-21 RX ORDER — DESVENLAFAXINE 50 MG/1
50 TABLET, EXTENDED RELEASE ORAL
Qty: 30 | Refills: 0 | Status: DISCONTINUED | COMMUNITY
Start: 2017-07-24 | End: 2020-07-21

## 2020-08-11 LAB
ALBUMIN SERPL ELPH-MCNC: 4.4 G/DL
ALP BLD-CCNC: 90 U/L
ALT SERPL-CCNC: 25 U/L
ANION GAP SERPL CALC-SCNC: 11 MMOL/L
AST SERPL-CCNC: 17 U/L
B BURGDOR AB SER-IMP: NEGATIVE
B BURGDOR IGM PATRN SER IB-IMP: NEGATIVE
B BURGDOR18KD IGG SER QL IB: NORMAL
B BURGDOR23KD IGG SER QL IB: NORMAL
B BURGDOR23KD IGM SER QL IB: NORMAL
B BURGDOR28KD IGG SER QL IB: NORMAL
B BURGDOR30KD IGG SER QL IB: NORMAL
B BURGDOR31KD IGG SER QL IB: NORMAL
B BURGDOR39KD IGG SER QL IB: NORMAL
B BURGDOR39KD IGM SER QL IB: NORMAL
B BURGDOR41KD IGG SER QL IB: PRESENT
B BURGDOR41KD IGM SER QL IB: PRESENT
B BURGDOR45KD IGG SER QL IB: NORMAL
B BURGDOR58KD IGG SER QL IB: PRESENT
B BURGDOR66KD IGG SER QL IB: NORMAL
B BURGDOR93KD IGG SER QL IB: NORMAL
BASOPHILS # BLD AUTO: 0.02 K/UL
BASOPHILS NFR BLD AUTO: 0.6 %
BILIRUB SERPL-MCNC: 0.4 MG/DL
BUN SERPL-MCNC: 14 MG/DL
CALCIUM SERPL-MCNC: 9.4 MG/DL
CHLORIDE SERPL-SCNC: 105 MMOL/L
CO2 SERPL-SCNC: 25 MMOL/L
CREAT SERPL-MCNC: 1.01 MG/DL
EOSINOPHIL # BLD AUTO: 0.42 K/UL
EOSINOPHIL NFR BLD AUTO: 12.5 %
GLUCOSE SERPL-MCNC: 109 MG/DL
HCT VFR BLD CALC: 38.6 %
HGB BLD-MCNC: 12 G/DL
IMM GRANULOCYTES NFR BLD AUTO: 0 %
LYMPHOCYTES # BLD AUTO: 1 K/UL
LYMPHOCYTES NFR BLD AUTO: 29.7 %
MAN DIFF?: NORMAL
MCHC RBC-ENTMCNC: 23.1 PG
MCHC RBC-ENTMCNC: 31.1 GM/DL
MCV RBC AUTO: 74.4 FL
MONOCYTES # BLD AUTO: 0.29 K/UL
MONOCYTES NFR BLD AUTO: 8.6 %
NEUTROPHILS # BLD AUTO: 1.64 K/UL
NEUTROPHILS NFR BLD AUTO: 48.6 %
PLATELET # BLD AUTO: 188 K/UL
POTASSIUM SERPL-SCNC: 4.1 MMOL/L
PROT SERPL-MCNC: 6.8 G/DL
RBC # BLD: 5.19 M/UL
RBC # FLD: 16.4 %
SODIUM SERPL-SCNC: 142 MMOL/L
WBC # FLD AUTO: 3.37 K/UL

## 2020-08-12 ENCOUNTER — EMERGENCY (EMERGENCY)
Facility: HOSPITAL | Age: 60
LOS: 1 days | Discharge: HOME | End: 2020-08-12
Attending: EMERGENCY MEDICINE
Payer: COMMERCIAL

## 2020-08-12 VITALS
TEMPERATURE: 99 F | DIASTOLIC BLOOD PRESSURE: 90 MMHG | HEIGHT: 71 IN | WEIGHT: 184.97 LBS | RESPIRATION RATE: 18 BRPM | HEART RATE: 96 BPM | OXYGEN SATURATION: 98 % | SYSTOLIC BLOOD PRESSURE: 138 MMHG

## 2020-08-12 DIAGNOSIS — Z98.89 OTHER SPECIFIED POSTPROCEDURAL STATES: Chronic | ICD-10-CM

## 2020-08-12 DIAGNOSIS — S02.609A FRACTURE OF MANDIBLE, UNSPECIFIED, INITIAL ENCOUNTER FOR CLOSED FRACTURE: Chronic | ICD-10-CM

## 2020-08-12 PROCEDURE — 70450 CT HEAD/BRAIN W/O DYE: CPT

## 2020-08-12 PROCEDURE — 99284 EMERGENCY DEPT VISIT MOD MDM: CPT

## 2020-08-12 PROCEDURE — 71045 X-RAY EXAM CHEST 1 VIEW: CPT

## 2020-08-12 PROCEDURE — 72125 CT NECK SPINE W/O DYE: CPT

## 2020-08-12 PROCEDURE — 70450 CT HEAD/BRAIN W/O DYE: CPT | Mod: 26

## 2020-08-12 PROCEDURE — 71045 X-RAY EXAM CHEST 1 VIEW: CPT | Mod: 26

## 2020-08-12 PROCEDURE — 93010 ELECTROCARDIOGRAM REPORT: CPT

## 2020-08-12 PROCEDURE — 93005 ELECTROCARDIOGRAM TRACING: CPT

## 2020-08-12 PROCEDURE — 72125 CT NECK SPINE W/O DYE: CPT | Mod: 26

## 2020-08-12 PROCEDURE — 99285 EMERGENCY DEPT VISIT HI MDM: CPT

## 2020-08-12 RX ORDER — ACETAMINOPHEN 500 MG
650 TABLET ORAL ONCE
Refills: 0 | Status: COMPLETED | OUTPATIENT
Start: 2020-08-12 | End: 2020-08-12

## 2020-08-12 RX ADMIN — Medication 650 MILLIGRAM(S): at 09:12

## 2020-08-12 NOTE — ED PROVIDER NOTE - PROGRESS NOTE DETAILS
patient has no acute complaints, states he feels tired from "lack of sleep and stress." Patient's brother will come pick him up
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
## 2020-08-12 NOTE — ED PROVIDER NOTE - NS ED ROS FT
General: Denies fever, chills  HEENT: Denies sensory changes, sore throat  Neck: Denies neck pain. + neck stiffness  Resp: Denies coughing, SOB  Cardiovascular: Denies CP, palpitations, LE edema  GI: Denies nausea, vomiting. + abdominal pain. Denies  diarrhea, constipation, blood in stool  : Denies dysuria, hematuria, frequency, incontinence  MSK: Denies back pain  Neuro: Denies HA, dizziness, numbness, weakness  Skin: Denies rashes.

## 2020-08-12 NOTE — ED PROVIDER NOTE - PHYSICAL EXAMINATION
General: Well appearing male in no acute distress  HEENT: Normocephalic, atraumatic. Moist mucous membranes. Oropharynx clear. No lymphadenopathy.  Eyes: No scleral icterus. EOMI. CECILY.  Neck:. Soft and supple. Full ROM without pain. No midline tenderness. No paraspinal muscle tenderness.   Cardiac: Regular rate and regular rhythm. No murmurs, rubs, gallops. Peripheral pulses 2+ and symmetric. No LE edema.  Resp: Lungs CTAB. Speaking in full sentences. No wheezes, rales or rhonchi.  Abd: Soft, non-tender, non-distended. No guarding or rebound. No scars, masses, or lesions. No bruising or abrasions noted.   Back: Spine midline and non-tender. No CVA tenderness.    Skin: No rashes, abrasions, or lacerations.  Neuro: AO x 3. Moves all extremities symmetrically. Motor strength and sensation grossly intact. Cerebellar signs intact. Gait normal, non-ataxic.

## 2020-08-12 NOTE — ED PROVIDER NOTE - PATIENT PORTAL LINK FT
You can access the FollowMyHealth Patient Portal offered by [facility] by registering at the following website: [email or web address]. By joining [employer]’s FollowMyHealth portal, you will also be able to view your health information using other applications (apps) compatible with our system.

## 2020-08-12 NOTE — ED ADULT TRIAGE NOTE - CHIEF COMPLAINT QUOTE
Pt arrives with waking up feeling nauseous, dizzy, light headed and with left sided abdominal pain. Pt denies chest pain, SOB, fever/chills

## 2020-08-12 NOTE — ED ADULT NURSE NOTE - NSIMPLEMENTINTERV_GEN_ALL_ED
Implemented All Fall with Harm Risk Interventions:  [place] to call system. Call bell, personal items and telephone within reach. Instruct patient to call for assistance. Room bathroom lighting operational. Non-slip footwear when patient is off stretcher. Physically safe environment: no spills, clutter or unnecessary equipment. Stretcher in lowest position, wheels locked, appropriate side rails in place. Provide visual cue, wrist band, yellow gown, etc. Monitor gait and stability. Monitor for mental status changes and reorient to person, place, and time. Review medications for side effects contributing to fall risk. Reinforce activity limits and safety measures with patient and family. Provide visual clues: red socks.
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
## 2020-12-16 PROBLEM — Z12.11 ENCOUNTER FOR SCREENING COLONOSCOPY: Status: RESOLVED | Noted: 2018-06-26 | Resolved: 2020-12-16

## 2021-04-04 ENCOUNTER — EMERGENCY (EMERGENCY)
Facility: HOSPITAL | Age: 61
LOS: 1 days | Discharge: HOME | End: 2021-04-04
Attending: EMERGENCY MEDICINE
Payer: COMMERCIAL

## 2021-04-04 VITALS
DIASTOLIC BLOOD PRESSURE: 77 MMHG | SYSTOLIC BLOOD PRESSURE: 121 MMHG | TEMPERATURE: 99 F | HEIGHT: 71 IN | HEART RATE: 82 BPM | OXYGEN SATURATION: 99 % | RESPIRATION RATE: 18 BRPM | WEIGHT: 184.97 LBS

## 2021-04-04 DIAGNOSIS — Z98.89 OTHER SPECIFIED POSTPROCEDURAL STATES: Chronic | ICD-10-CM

## 2021-04-04 DIAGNOSIS — S02.609A FRACTURE OF MANDIBLE, UNSPECIFIED, INITIAL ENCOUNTER FOR CLOSED FRACTURE: Chronic | ICD-10-CM

## 2021-04-04 LAB
ALBUMIN SERPL ELPH-MCNC: 3.4 G/DL — SIGNIFICANT CHANGE UP (ref 3.3–5.2)
ALP SERPL-CCNC: 69 U/L — SIGNIFICANT CHANGE UP (ref 40–120)
ALT FLD-CCNC: 29 U/L — SIGNIFICANT CHANGE UP
ANION GAP SERPL CALC-SCNC: 12 MMOL/L — SIGNIFICANT CHANGE UP (ref 5–17)
ANISOCYTOSIS BLD QL: SLIGHT — SIGNIFICANT CHANGE UP
APPEARANCE UR: CLEAR — SIGNIFICANT CHANGE UP
AST SERPL-CCNC: 36 U/L — SIGNIFICANT CHANGE UP
AUER BODIES BLD QL SMEAR: PRESENT — SIGNIFICANT CHANGE UP
BACTERIA # UR AUTO: ABNORMAL
BASOPHILS # BLD AUTO: 0.08 K/UL — SIGNIFICANT CHANGE UP (ref 0–0.2)
BASOPHILS NFR BLD AUTO: 1.7 % — SIGNIFICANT CHANGE UP (ref 0–2)
BILIRUB SERPL-MCNC: 0.8 MG/DL — SIGNIFICANT CHANGE UP (ref 0.4–2)
BILIRUB UR-MCNC: NEGATIVE — SIGNIFICANT CHANGE UP
BUN SERPL-MCNC: 14 MG/DL — SIGNIFICANT CHANGE UP (ref 8–20)
CALCIUM SERPL-MCNC: 8.8 MG/DL — SIGNIFICANT CHANGE UP (ref 8.6–10.2)
CHLORIDE SERPL-SCNC: 103 MMOL/L — SIGNIFICANT CHANGE UP (ref 98–107)
CO2 SERPL-SCNC: 19 MMOL/L — LOW (ref 22–29)
COLOR SPEC: YELLOW — SIGNIFICANT CHANGE UP
COMMENT - URINE: SIGNIFICANT CHANGE UP
CREAT SERPL-MCNC: 0.95 MG/DL — SIGNIFICANT CHANGE UP (ref 0.5–1.3)
DIFF PNL FLD: ABNORMAL
ELLIPTOCYTES BLD QL SMEAR: SLIGHT — SIGNIFICANT CHANGE UP
EOSINOPHIL # BLD AUTO: 0.32 K/UL — SIGNIFICANT CHANGE UP (ref 0–0.5)
EOSINOPHIL NFR BLD AUTO: 7 % — HIGH (ref 0–6)
EPI CELLS # UR: SIGNIFICANT CHANGE UP
GIANT PLATELETS BLD QL SMEAR: PRESENT — SIGNIFICANT CHANGE UP
GLUCOSE SERPL-MCNC: 102 MG/DL — HIGH (ref 70–99)
GLUCOSE UR QL: NEGATIVE MG/DL — SIGNIFICANT CHANGE UP
HCT VFR BLD CALC: 42.8 % — SIGNIFICANT CHANGE UP (ref 39–50)
HGB BLD-MCNC: 13.5 G/DL — SIGNIFICANT CHANGE UP (ref 13–17)
HYALINE CASTS # UR AUTO: ABNORMAL /LPF
KETONES UR-MCNC: ABNORMAL
LEUKOCYTE ESTERASE UR-ACNC: NEGATIVE — SIGNIFICANT CHANGE UP
LYMPHOCYTES # BLD AUTO: 1.25 K/UL — SIGNIFICANT CHANGE UP (ref 1–3.3)
LYMPHOCYTES # BLD AUTO: 27.2 % — SIGNIFICANT CHANGE UP (ref 13–44)
MANUAL SMEAR VERIFICATION: SIGNIFICANT CHANGE UP
MCHC RBC-ENTMCNC: 22.9 PG — LOW (ref 27–34)
MCHC RBC-ENTMCNC: 31.5 GM/DL — LOW (ref 32–36)
MCV RBC AUTO: 72.7 FL — LOW (ref 80–100)
MICROCYTES BLD QL: SLIGHT — SIGNIFICANT CHANGE UP
MONOCYTES # BLD AUTO: 0.56 K/UL — SIGNIFICANT CHANGE UP (ref 0–0.9)
MONOCYTES NFR BLD AUTO: 12.3 % — SIGNIFICANT CHANGE UP (ref 2–14)
NEUTROPHILS # BLD AUTO: 2.3 K/UL — SIGNIFICANT CHANGE UP (ref 1.8–7.4)
NEUTROPHILS NFR BLD AUTO: 48.2 % — SIGNIFICANT CHANGE UP (ref 43–77)
NEUTS BAND # BLD: 1.8 % — SIGNIFICANT CHANGE UP (ref 0–8)
NITRITE UR-MCNC: NEGATIVE — SIGNIFICANT CHANGE UP
OVALOCYTES BLD QL SMEAR: SLIGHT — SIGNIFICANT CHANGE UP
PH UR: 5 — SIGNIFICANT CHANGE UP (ref 5–8)
PLAT MORPH BLD: NORMAL — SIGNIFICANT CHANGE UP
PLATELET # BLD AUTO: 170 K/UL — SIGNIFICANT CHANGE UP (ref 150–400)
POIKILOCYTOSIS BLD QL AUTO: SLIGHT — SIGNIFICANT CHANGE UP
POLYCHROMASIA BLD QL SMEAR: SLIGHT — SIGNIFICANT CHANGE UP
POTASSIUM SERPL-MCNC: 5 MMOL/L — SIGNIFICANT CHANGE UP (ref 3.5–5.3)
POTASSIUM SERPL-SCNC: 5 MMOL/L — SIGNIFICANT CHANGE UP (ref 3.5–5.3)
PROT SERPL-MCNC: 7.1 G/DL — SIGNIFICANT CHANGE UP (ref 6.6–8.7)
PROT UR-MCNC: 30 MG/DL
RBC # BLD: 5.89 M/UL — HIGH (ref 4.2–5.8)
RBC # FLD: 15.9 % — HIGH (ref 10.3–14.5)
RBC BLD AUTO: ABNORMAL
RBC CASTS # UR COMP ASSIST: SIGNIFICANT CHANGE UP /HPF (ref 0–4)
SARS-COV-2 RNA SPEC QL NAA+PROBE: SIGNIFICANT CHANGE UP
SODIUM SERPL-SCNC: 133 MMOL/L — LOW (ref 135–145)
SP GR SPEC: 1.02 — SIGNIFICANT CHANGE UP (ref 1.01–1.02)
UROBILINOGEN FLD QL: 1 MG/DL
VARIANT LYMPHS # BLD: 1.8 % — SIGNIFICANT CHANGE UP (ref 0–6)
WBC # BLD: 4.59 K/UL — SIGNIFICANT CHANGE UP (ref 3.8–10.5)
WBC # FLD AUTO: 4.59 K/UL — SIGNIFICANT CHANGE UP (ref 3.8–10.5)
WBC UR QL: SIGNIFICANT CHANGE UP

## 2021-04-04 PROCEDURE — 85025 COMPLETE CBC W/AUTO DIFF WBC: CPT

## 2021-04-04 PROCEDURE — U0003: CPT

## 2021-04-04 PROCEDURE — 36415 COLL VENOUS BLD VENIPUNCTURE: CPT

## 2021-04-04 PROCEDURE — 81001 URINALYSIS AUTO W/SCOPE: CPT

## 2021-04-04 PROCEDURE — 80053 COMPREHEN METABOLIC PANEL: CPT

## 2021-04-04 PROCEDURE — U0005: CPT

## 2021-04-04 PROCEDURE — 99283 EMERGENCY DEPT VISIT LOW MDM: CPT

## 2021-04-04 PROCEDURE — 93005 ELECTROCARDIOGRAM TRACING: CPT

## 2021-04-04 PROCEDURE — 93010 ELECTROCARDIOGRAM REPORT: CPT

## 2021-04-04 PROCEDURE — 99284 EMERGENCY DEPT VISIT MOD MDM: CPT

## 2021-04-04 NOTE — ED STATDOCS - ATTENDING CONTRIBUTION TO CARE
I, Kayla Langford, performed the initial face to face bedside interview with this patient regarding history of present illness, review of symptoms and relevant past medical, social and family history.  I completed an independent physical examination.  I was the initial provider who evaluated this patient. I have signed out the follow up of any pending tests (i.e. labs, radiological studies) to the ACP.  I have communicated the patient’s plan of care and disposition with the ACP.  The history, relevant review of systems, past medical and surgical history, medical decision making, and physical examination was documented by the scribe in my presence and I attest to the accuracy of the documentation.

## 2021-04-04 NOTE — ED STATDOCS - PROGRESS NOTE DETAILS
OBINNA PERALTA: PT evaluated by intake physician. HPI/PE/ROS as noted above. Will follow up plan per intake physician   medical clearance will email results of covid

## 2021-04-04 NOTE — ED STATDOCS - PATIENT PORTAL LINK FT
You can access the FollowMyHealth Patient Portal offered by [facility] by registering at the following website: [email or web address]. By joining [employer]’s FollowMyHealth portal, you will also be able to view your health information using other applications (apps) compatible with our system.

## 2021-04-04 NOTE — ED STATDOCS - NSFOLLOWUPINSTRUCTIONS_ED_ALL_ED_FT
please remember to bring all results with you to the [facility]  patient is medically cleared for discharge

## 2021-04-04 NOTE — ED STATDOCS - OBJECTIVE STATEMENT
61 y/o M with PMHx of Enlarged prostate, PTSD, and Thalassanemia presents to ED requesting medical clearance for admittance to [facility]. Pt notes cocaine abuse with last usage last night. Denies ETOH use.
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
## 2021-06-04 ENCOUNTER — APPOINTMENT (OUTPATIENT)
Dept: PULMONOLOGY | Facility: CLINIC | Age: 61
End: 2021-06-04
Payer: MEDICAID

## 2021-06-04 VITALS
SYSTOLIC BLOOD PRESSURE: 132 MMHG | HEART RATE: 67 BPM | WEIGHT: 158 LBS | BODY MASS INDEX: 22.04 KG/M2 | DIASTOLIC BLOOD PRESSURE: 84 MMHG | OXYGEN SATURATION: 98 %

## 2021-06-04 DIAGNOSIS — Z86.2 PERSONAL HISTORY OF DISEASES OF THE BLOOD AND BLOOD-FORMING ORGANS AND CERTAIN DISORDERS INVOLVING THE IMMUNE MECHANISM: ICD-10-CM

## 2021-06-04 PROCEDURE — 99072 ADDL SUPL MATRL&STAF TM PHE: CPT

## 2021-06-04 PROCEDURE — 99203 OFFICE O/P NEW LOW 30 MIN: CPT

## 2021-06-04 RX ORDER — DOXYCYCLINE HYCLATE 50 MG/1
CAPSULE ORAL
Refills: 0 | Status: DISCONTINUED | COMMUNITY
End: 2021-06-04

## 2021-06-04 NOTE — PHYSICAL EXAM
[No Acute Distress] : no acute distress [Normal Appearance] : normal appearance [No Neck Mass] : no neck mass [Normal Rate/Rhythm] : normal rate/rhythm [Normal S1, S2] : normal s1, s2 [No Murmurs] : no murmurs [No Resp Distress] : no resp distress [Clear to Auscultation Bilaterally] : clear to auscultation bilaterally [No Abnormalities] : no abnormalities [Benign] : benign [Normal Gait] : normal gait [No Clubbing] : no clubbing [No Cyanosis] : no cyanosis [No Edema] : no edema [Normal Color/ Pigmentation] : normal color/ pigmentation [No Focal Deficits] : no focal deficits [Oriented x3] : oriented x3 [Normal Affect] : normal affect [Low Lying Soft Palate] : low lying soft palate [Enlarged Base of the Tongue] : enlarged base of the tongue [Retrognathia] : retrognathia [III] : Mallampati Class: III

## 2021-06-04 NOTE — CONSULT LETTER
[Dear  ___] : Dear  [unfilled], [Consult Letter:] : I had the pleasure of evaluating your patient, [unfilled]. [Please see my note below.] : Please see my note below. [Consult Closing:] : Thank you very much for allowing me to participate in the care of this patient.  If you have any questions, please do not hesitate to contact me. [Sincerely,] : Sincerely, [DrJayant  ___] : Dr. SILVA

## 2021-06-10 ENCOUNTER — EMERGENCY (EMERGENCY)
Facility: HOSPITAL | Age: 61
LOS: 1 days | Discharge: HOME | End: 2021-06-10
Attending: STUDENT IN AN ORGANIZED HEALTH CARE EDUCATION/TRAINING PROGRAM
Payer: COMMERCIAL

## 2021-06-10 VITALS
SYSTOLIC BLOOD PRESSURE: 148 MMHG | HEIGHT: 71 IN | RESPIRATION RATE: 17 BRPM | HEART RATE: 111 BPM | TEMPERATURE: 98 F | OXYGEN SATURATION: 98 % | DIASTOLIC BLOOD PRESSURE: 103 MMHG

## 2021-06-10 DIAGNOSIS — Z98.89 OTHER SPECIFIED POSTPROCEDURAL STATES: Chronic | ICD-10-CM

## 2021-06-10 DIAGNOSIS — S02.609A FRACTURE OF MANDIBLE, UNSPECIFIED, INITIAL ENCOUNTER FOR CLOSED FRACTURE: Chronic | ICD-10-CM

## 2021-06-10 LAB
HCT VFR BLD CALC: 39.8 % — SIGNIFICANT CHANGE UP (ref 39–50)
HGB BLD-MCNC: 12.7 G/DL — LOW (ref 13–17)
MCHC RBC-ENTMCNC: 23.4 PG — LOW (ref 27–34)
MCHC RBC-ENTMCNC: 31.9 GM/DL — LOW (ref 32–36)
MCV RBC AUTO: 73.3 FL — LOW (ref 80–100)
PLATELET # BLD AUTO: 160 K/UL — SIGNIFICANT CHANGE UP (ref 150–400)
RBC # BLD: 5.43 M/UL — SIGNIFICANT CHANGE UP (ref 4.2–5.8)
RBC # FLD: 15.5 % — HIGH (ref 10.3–14.5)
WBC # BLD: 5.09 K/UL — SIGNIFICANT CHANGE UP (ref 3.8–10.5)
WBC # FLD AUTO: 5.09 K/UL — SIGNIFICANT CHANGE UP (ref 3.8–10.5)

## 2021-06-10 PROCEDURE — 99284 EMERGENCY DEPT VISIT MOD MDM: CPT | Mod: 25

## 2021-06-10 PROCEDURE — 85025 COMPLETE CBC W/AUTO DIFF WBC: CPT

## 2021-06-10 PROCEDURE — 93005 ELECTROCARDIOGRAM TRACING: CPT

## 2021-06-10 PROCEDURE — 73030 X-RAY EXAM OF SHOULDER: CPT | Mod: 26,LT

## 2021-06-10 PROCEDURE — 84484 ASSAY OF TROPONIN QUANT: CPT

## 2021-06-10 PROCEDURE — 83735 ASSAY OF MAGNESIUM: CPT

## 2021-06-10 PROCEDURE — 73030 X-RAY EXAM OF SHOULDER: CPT

## 2021-06-10 PROCEDURE — 83690 ASSAY OF LIPASE: CPT

## 2021-06-10 PROCEDURE — 71046 X-RAY EXAM CHEST 2 VIEWS: CPT

## 2021-06-10 PROCEDURE — 99285 EMERGENCY DEPT VISIT HI MDM: CPT

## 2021-06-10 PROCEDURE — 93010 ELECTROCARDIOGRAM REPORT: CPT

## 2021-06-10 PROCEDURE — 36415 COLL VENOUS BLD VENIPUNCTURE: CPT

## 2021-06-10 PROCEDURE — 80053 COMPREHEN METABOLIC PANEL: CPT

## 2021-06-10 PROCEDURE — 70450 CT HEAD/BRAIN W/O DYE: CPT

## 2021-06-10 PROCEDURE — 71046 X-RAY EXAM CHEST 2 VIEWS: CPT | Mod: 26
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
## 2021-06-10 NOTE — ED STATDOCS - PROGRESS NOTE DETAILS
Keturah MCLAUGHLIN for ED attending, Dr. Mead. 62 y/o male with PMHx of Enlarged prostate, PTSD, AND Thalassanemia presents to the ED c/o psychiatric evaluation. Patient did report of having chest pressure. Patient admits to the use of alcohol and Adderall use prior to visit. Will send to main for further evaluation.

## 2021-06-10 NOTE — ED PROVIDER NOTE - NSFOLLOWUPINSTRUCTIONS_ED_ALL_ED_FT
Shoulder Pain    Many things can cause shoulder pain, including:    An injury to the area.  Overuse of the shoulder.  Arthritis.    The source of the pain can be:    Inflammation.  An injury to the shoulder joint.  An injury to a tendon, ligament, or bone.    Follow these instructions at home:  Take these actions to help with your pain:     Squeeze a soft ball or a foam pad as much as possible. This helps to keep the shoulder from swelling. It also helps to strengthen the arm.  Take over-the-counter and prescription medicines only as told by your health care provider.  If directed, apply ice to the area:  Put ice in a plastic bag.  Place a towel between your skin and the bag.  Leave the ice on for 20 minutes, 2–3 times per day. Stop applying ice if it does not help with the pain.  If you were given a shoulder sling or immobilizer:  Wear it as told.  Remove it to shower or bathe.  Move your arm as little as possible, but keep your hand moving to prevent swelling.    Contact a health care provider if:  Your pain gets worse.  Your pain is not relieved with medicines.  New pain develops in your arm, hand, or fingers.    Get help right away if:  Your arm, hand, or fingers:  Tingle.  Become numb.  Become swollen.  Become painful.  Turn white or blue.    ADDITIONAL NOTES AND INSTRUCTIONS    -Please follow-up with your primary care doctor in the next 2 days.  Please call tomorrow for an appointment.  If you cannot follow-up with your primary care doctor please return to the ED for any urgent issues.  - You were given a copy of the tests performed today.  Please bring the results with you and review them with your primary care doctor.  - If you have any worsening of symptoms or any other concerns please return to the ED immediately.  - Please continue taking your home medications as directed.

## 2021-06-10 NOTE — ED ADULT TRIAGE NOTE - CHIEF COMPLAINT QUOTE
requesting to speak with social work/psychiatry s/p "witnessing some things today". pt refusing to elaborate in triage. denies SI/HI. +etoh.

## 2021-06-10 NOTE — ED ADULT NURSE NOTE - NS ED NURSE RECORD ANOTHER VITAL SIGN
Yes Bexarotene Counseling:  I discussed with the patient the risks of bexarotene including but not limited to hair loss, dry lips/skin/eyes, liver abnormalities, hyperlipidemia, pancreatitis, depression/suicidal ideation, photosensitivity, drug rash/allergic reactions, hypothyroidism, anemia, leukopenia, infection, cataracts, and teratogenicity.  Patient understands that they will need regular blood tests to check lipid profile, liver function tests, white blood cell count, thyroid function tests and pregnancy test if applicable.

## 2021-06-10 NOTE — ED PROVIDER NOTE - CLINICAL SUMMARY MEDICAL DECISION MAKING FREE TEXT BOX
60y/o M with PMHx of PTSD, Thalassanemia presents to the ED c/o fatigue. 62y/o M with PMHx of PTSD, Thalassanemia presents to the ED c/o fatigue. Pt with labs and CTH wnl, pt instructed to follow up with PMD and therapist, given ortho follow up for chronic shoulder pain, ekg wnl no acute ischemic changes, cxr and left shoulder xray no acute findings, stable for dc with follow up

## 2021-06-10 NOTE — ED PROVIDER NOTE - PHYSICAL EXAMINATION
A&Ox3, moving all extremities symmetrically, follows commands, motor lars upper and lower ext 5/5, sensory symm and intact CN 2-12 grossly intact, no ataxia, no nystagmus, no dysmetria, no ddk, symm lars, no pronator drift

## 2021-06-10 NOTE — ED PROVIDER NOTE - PATIENT PORTAL LINK FT
You can access the FollowMyHealth Patient Portal offered by [facility] by registering at the following website: [email or web address]. By joining [employer]’s FollowMyHealth portal, you will also be able to view your health information using other applications (apps) compatible with our system.
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
## 2021-06-11 VITALS
HEART RATE: 88 BPM | OXYGEN SATURATION: 98 % | TEMPERATURE: 98 F | RESPIRATION RATE: 15 BRPM | DIASTOLIC BLOOD PRESSURE: 71 MMHG | SYSTOLIC BLOOD PRESSURE: 127 MMHG

## 2021-06-11 LAB
ALBUMIN SERPL ELPH-MCNC: 4.3 G/DL — SIGNIFICANT CHANGE UP (ref 3.3–5.2)
ALP SERPL-CCNC: 59 U/L — SIGNIFICANT CHANGE UP (ref 40–120)
ALT FLD-CCNC: 23 U/L — SIGNIFICANT CHANGE UP
ANION GAP SERPL CALC-SCNC: 10 MMOL/L — SIGNIFICANT CHANGE UP (ref 5–17)
AST SERPL-CCNC: 26 U/L — SIGNIFICANT CHANGE UP
BASOPHILS # BLD AUTO: 0.13 K/UL — SIGNIFICANT CHANGE UP (ref 0–0.2)
BASOPHILS NFR BLD AUTO: 2.6 % — HIGH (ref 0–2)
BILIRUB SERPL-MCNC: 1.5 MG/DL — SIGNIFICANT CHANGE UP (ref 0.4–2)
BUN SERPL-MCNC: 13.7 MG/DL — SIGNIFICANT CHANGE UP (ref 8–20)
CALCIUM SERPL-MCNC: 9.1 MG/DL — SIGNIFICANT CHANGE UP (ref 8.6–10.2)
CHLORIDE SERPL-SCNC: 100 MMOL/L — SIGNIFICANT CHANGE UP (ref 98–107)
CO2 SERPL-SCNC: 27 MMOL/L — SIGNIFICANT CHANGE UP (ref 22–29)
CREAT SERPL-MCNC: 0.93 MG/DL — SIGNIFICANT CHANGE UP (ref 0.5–1.3)
EOSINOPHIL # BLD AUTO: 0.18 K/UL — SIGNIFICANT CHANGE UP (ref 0–0.5)
EOSINOPHIL NFR BLD AUTO: 3.5 % — SIGNIFICANT CHANGE UP (ref 0–6)
GIANT PLATELETS BLD QL SMEAR: PRESENT — SIGNIFICANT CHANGE UP
GLUCOSE SERPL-MCNC: 83 MG/DL — SIGNIFICANT CHANGE UP (ref 70–99)
LIDOCAIN IGE QN: 24 U/L — SIGNIFICANT CHANGE UP (ref 22–51)
LYMPHOCYTES # BLD AUTO: 1.74 K/UL — SIGNIFICANT CHANGE UP (ref 1–3.3)
LYMPHOCYTES # BLD AUTO: 34.2 % — SIGNIFICANT CHANGE UP (ref 13–44)
MAGNESIUM SERPL-MCNC: 1.8 MG/DL — SIGNIFICANT CHANGE UP (ref 1.6–2.6)
MANUAL SMEAR VERIFICATION: SIGNIFICANT CHANGE UP
METAMYELOCYTES # FLD: 0.9 % — HIGH (ref 0–0)
MONOCYTES # BLD AUTO: 0.4 K/UL — SIGNIFICANT CHANGE UP (ref 0–0.9)
MONOCYTES NFR BLD AUTO: 7.9 % — SIGNIFICANT CHANGE UP (ref 2–14)
NEUTROPHILS # BLD AUTO: 2.5 K/UL — SIGNIFICANT CHANGE UP (ref 1.8–7.4)
NEUTROPHILS NFR BLD AUTO: 47.4 % — SIGNIFICANT CHANGE UP (ref 43–77)
NEUTS BAND # BLD: 1.7 % — SIGNIFICANT CHANGE UP (ref 0–8)
OVALOCYTES BLD QL SMEAR: SLIGHT — SIGNIFICANT CHANGE UP
PLAT MORPH BLD: NORMAL — SIGNIFICANT CHANGE UP
POIKILOCYTOSIS BLD QL AUTO: SLIGHT — SIGNIFICANT CHANGE UP
POLYCHROMASIA BLD QL SMEAR: SLIGHT — SIGNIFICANT CHANGE UP
POTASSIUM SERPL-MCNC: 3.9 MMOL/L — SIGNIFICANT CHANGE UP (ref 3.5–5.3)
POTASSIUM SERPL-SCNC: 3.9 MMOL/L — SIGNIFICANT CHANGE UP (ref 3.5–5.3)
PROT SERPL-MCNC: 7.3 G/DL — SIGNIFICANT CHANGE UP (ref 6.6–8.7)
RBC BLD AUTO: ABNORMAL
SMUDGE CELLS # BLD: PRESENT — SIGNIFICANT CHANGE UP
SODIUM SERPL-SCNC: 137 MMOL/L — SIGNIFICANT CHANGE UP (ref 135–145)
TROPONIN T SERPL-MCNC: <0.01 NG/ML — SIGNIFICANT CHANGE UP (ref 0–0.06)
VARIANT LYMPHS # BLD: 1.8 % — SIGNIFICANT CHANGE UP (ref 0–6)

## 2021-06-11 PROCEDURE — 70450 CT HEAD/BRAIN W/O DYE: CPT | Mod: 26,MA

## 2021-06-21 NOTE — ED PROVIDER NOTE - EYES, MLM
Clear bilaterally, pupils equal, round and reactive to light. [Initial] : an initial visit [Family Member] : family member [TextBox_44] : COPD, ?JOSESITO, Allergies, GERD, lung CA screening

## 2021-07-04 ENCOUNTER — EMERGENCY (EMERGENCY)
Facility: HOSPITAL | Age: 61
LOS: 1 days | Discharge: HOME | End: 2021-07-04
Attending: EMERGENCY MEDICINE
Payer: COMMERCIAL

## 2021-07-04 VITALS
DIASTOLIC BLOOD PRESSURE: 49 MMHG | OXYGEN SATURATION: 95 % | RESPIRATION RATE: 22 BRPM | HEART RATE: 125 BPM | TEMPERATURE: 98 F | HEIGHT: 71 IN | WEIGHT: 186.07 LBS | SYSTOLIC BLOOD PRESSURE: 93 MMHG

## 2021-07-04 DIAGNOSIS — Z98.89 OTHER SPECIFIED POSTPROCEDURAL STATES: Chronic | ICD-10-CM

## 2021-07-04 DIAGNOSIS — S02.609A FRACTURE OF MANDIBLE, UNSPECIFIED, INITIAL ENCOUNTER FOR CLOSED FRACTURE: Chronic | ICD-10-CM

## 2021-07-04 LAB
ALBUMIN SERPL ELPH-MCNC: 3.7 G/DL — SIGNIFICANT CHANGE UP (ref 3.3–5.2)
ALP SERPL-CCNC: 83 U/L — SIGNIFICANT CHANGE UP (ref 40–120)
ALT FLD-CCNC: 26 U/L — SIGNIFICANT CHANGE UP
ANION GAP SERPL CALC-SCNC: 10 MMOL/L — SIGNIFICANT CHANGE UP (ref 5–17)
APTT BLD: 29.1 SEC — SIGNIFICANT CHANGE UP (ref 27.5–35.5)
AST SERPL-CCNC: 33 U/L — SIGNIFICANT CHANGE UP
BASOPHILS # BLD AUTO: 0.02 K/UL — SIGNIFICANT CHANGE UP (ref 0–0.2)
BASOPHILS NFR BLD AUTO: 0.5 % — SIGNIFICANT CHANGE UP (ref 0–2)
BILIRUB SERPL-MCNC: 0.4 MG/DL — SIGNIFICANT CHANGE UP (ref 0.4–2)
BUN SERPL-MCNC: 15 MG/DL — SIGNIFICANT CHANGE UP (ref 8–20)
CALCIUM SERPL-MCNC: 8.5 MG/DL — LOW (ref 8.6–10.2)
CHLORIDE SERPL-SCNC: 104 MMOL/L — SIGNIFICANT CHANGE UP (ref 98–107)
CO2 SERPL-SCNC: 24 MMOL/L — SIGNIFICANT CHANGE UP (ref 22–29)
CREAT SERPL-MCNC: 1.06 MG/DL — SIGNIFICANT CHANGE UP (ref 0.5–1.3)
EOSINOPHIL # BLD AUTO: 0.21 K/UL — SIGNIFICANT CHANGE UP (ref 0–0.5)
EOSINOPHIL NFR BLD AUTO: 5.2 % — SIGNIFICANT CHANGE UP (ref 0–6)
GLUCOSE SERPL-MCNC: 77 MG/DL — SIGNIFICANT CHANGE UP (ref 70–99)
HCT VFR BLD CALC: 37.2 % — LOW (ref 39–50)
HGB BLD-MCNC: 12 G/DL — LOW (ref 13–17)
IMM GRANULOCYTES NFR BLD AUTO: 0 % — SIGNIFICANT CHANGE UP (ref 0–1.5)
INR BLD: 1.12 RATIO — SIGNIFICANT CHANGE UP (ref 0.88–1.16)
LYMPHOCYTES # BLD AUTO: 1.11 K/UL — SIGNIFICANT CHANGE UP (ref 1–3.3)
LYMPHOCYTES # BLD AUTO: 27.3 % — SIGNIFICANT CHANGE UP (ref 13–44)
MAGNESIUM SERPL-MCNC: 2 MG/DL — SIGNIFICANT CHANGE UP (ref 1.8–2.6)
MCHC RBC-ENTMCNC: 23.6 PG — LOW (ref 27–34)
MCHC RBC-ENTMCNC: 32.3 GM/DL — SIGNIFICANT CHANGE UP (ref 32–36)
MCV RBC AUTO: 73.1 FL — LOW (ref 80–100)
MONOCYTES # BLD AUTO: 0.46 K/UL — SIGNIFICANT CHANGE UP (ref 0–0.9)
MONOCYTES NFR BLD AUTO: 11.3 % — SIGNIFICANT CHANGE UP (ref 2–14)
NEUTROPHILS # BLD AUTO: 2.26 K/UL — SIGNIFICANT CHANGE UP (ref 1.8–7.4)
NEUTROPHILS NFR BLD AUTO: 55.7 % — SIGNIFICANT CHANGE UP (ref 43–77)
NT-PROBNP SERPL-SCNC: 66 PG/ML — SIGNIFICANT CHANGE UP (ref 0–300)
PLATELET # BLD AUTO: 181 K/UL — SIGNIFICANT CHANGE UP (ref 150–400)
POTASSIUM SERPL-MCNC: 5.6 MMOL/L — HIGH (ref 3.5–5.3)
POTASSIUM SERPL-SCNC: 5.6 MMOL/L — HIGH (ref 3.5–5.3)
PROT SERPL-MCNC: 6.6 G/DL — SIGNIFICANT CHANGE UP (ref 6.6–8.7)
PROTHROM AB SERPL-ACNC: 12.9 SEC — SIGNIFICANT CHANGE UP (ref 10.6–13.6)
RBC # BLD: 5.09 M/UL — SIGNIFICANT CHANGE UP (ref 4.2–5.8)
RBC # FLD: 15.9 % — HIGH (ref 10.3–14.5)
SODIUM SERPL-SCNC: 138 MMOL/L — SIGNIFICANT CHANGE UP (ref 135–145)
TROPONIN T SERPL-MCNC: <0.01 NG/ML — SIGNIFICANT CHANGE UP (ref 0–0.06)
TROPONIN T SERPL-MCNC: <0.01 NG/ML — SIGNIFICANT CHANGE UP (ref 0–0.06)
WBC # BLD: 4.06 K/UL — SIGNIFICANT CHANGE UP (ref 3.8–10.5)
WBC # FLD AUTO: 4.06 K/UL — SIGNIFICANT CHANGE UP (ref 3.8–10.5)

## 2021-07-04 PROCEDURE — 71275 CT ANGIOGRAPHY CHEST: CPT | Mod: 26

## 2021-07-04 PROCEDURE — 99254 IP/OBS CNSLTJ NEW/EST MOD 60: CPT

## 2021-07-04 PROCEDURE — 74174 CTA ABD&PLVS W/CONTRAST: CPT | Mod: 26,MG

## 2021-07-04 PROCEDURE — 93010 ELECTROCARDIOGRAM REPORT: CPT

## 2021-07-04 PROCEDURE — 99291 CRITICAL CARE FIRST HOUR: CPT

## 2021-07-04 PROCEDURE — 99283 EMERGENCY DEPT VISIT LOW MDM: CPT

## 2021-07-04 PROCEDURE — G1004: CPT

## 2021-07-04 PROCEDURE — 71045 X-RAY EXAM CHEST 1 VIEW: CPT | Mod: 26

## 2021-07-04 RX ORDER — SODIUM CHLORIDE 9 MG/ML
1000 INJECTION INTRAMUSCULAR; INTRAVENOUS; SUBCUTANEOUS ONCE
Refills: 0 | Status: COMPLETED | OUTPATIENT
Start: 2021-07-04 | End: 2021-07-04

## 2021-07-04 RX ORDER — ACETAMINOPHEN 500 MG
975 TABLET ORAL ONCE
Refills: 0 | Status: COMPLETED | OUTPATIENT
Start: 2021-07-04 | End: 2021-07-04

## 2021-07-04 RX ORDER — FOLIC ACID 0.8 MG
1 TABLET ORAL DAILY
Refills: 0 | Status: DISCONTINUED | OUTPATIENT
Start: 2021-07-04 | End: 2021-07-09

## 2021-07-04 RX ORDER — ADENOSINE 3 MG/ML
6 INJECTION INTRAVENOUS ONCE
Refills: 0 | Status: COMPLETED | OUTPATIENT
Start: 2021-07-04 | End: 2021-07-04

## 2021-07-04 RX ORDER — TAMSULOSIN HYDROCHLORIDE 0.4 MG/1
0.8 CAPSULE ORAL DAILY
Refills: 0 | Status: DISCONTINUED | OUTPATIENT
Start: 2021-07-04 | End: 2021-07-09

## 2021-07-04 RX ORDER — RISPERIDONE 4 MG/1
1 TABLET ORAL AT BEDTIME
Refills: 0 | Status: DISCONTINUED | OUTPATIENT
Start: 2021-07-04 | End: 2021-07-09

## 2021-07-04 RX ADMIN — Medication 975 MILLIGRAM(S): at 17:46

## 2021-07-04 RX ADMIN — SODIUM CHLORIDE 1000 MILLILITER(S): 9 INJECTION INTRAMUSCULAR; INTRAVENOUS; SUBCUTANEOUS at 15:19

## 2021-07-04 RX ADMIN — SODIUM CHLORIDE 1000 MILLILITER(S): 9 INJECTION INTRAMUSCULAR; INTRAVENOUS; SUBCUTANEOUS at 15:06

## 2021-07-04 RX ADMIN — RISPERIDONE 1 MILLIGRAM(S): 4 TABLET ORAL at 22:29

## 2021-07-04 RX ADMIN — ADENOSINE 6 MILLIGRAM(S): 3 INJECTION INTRAVENOUS at 17:00

## 2021-07-04 NOTE — ED ADULT NURSE NOTE - PMH
prn         Luci Keller MD Enlarged prostate    PE (pulmonary thromboembolism)    Post traumatic stress disorder    Thalassanemia, minor

## 2021-07-04 NOTE — ED PROCEDURE NOTE - PROCEDURE ADDITIONAL DETAILS
Educational Bedside U/S performed, no significant pericardial effusion, RV:LV ratio appears appropriate, confirmatory study to follow/completed. Explained to pt u/s study educational and not diagnostic and verbal consent provided.

## 2021-07-04 NOTE — ED PROVIDER NOTE - PHYSICAL EXAMINATION
General: well appearing, NAD  Head: NC, AT  EENT: EOMI, no scleral icterus  Cardiac: tachycardic, no apparent murmurs, no lower extremity edema  Respiratory: CTABL, no respiratory distress   Abdomen: soft, ND, NT, nonperitonitic  MSK/Vascular: full ROM, soft compartments, warm extremities  Neuro: AAOx3, sensation to light touch intact  Psych: calm, cooperative General: well appearing, NAD  Head: NC, AT  EENT: EOMI, no scleral icterus  Cardiac: tachycardic, no apparent murmurs, no lower extremity edema, no calf tenderness  Respiratory: CTABL, no respiratory distress   Abdomen: soft, ND, NT, nonperitonitic  MSK/Vascular: full ROM, soft compartments, warm extremities  Neuro: AAOx3, sensation to light touch intact  Psych: calm, cooperative

## 2021-07-04 NOTE — ED CDU PROVIDER INITIAL DAY NOTE - PHYSICAL EXAMINATION
General: well appearing, NAD  Head: NC, AT  EENT: EOMI, no scleral icterus  Cardiac: tachycardic, no apparent murmurs, no lower extremity edema, no calf tenderness  Respiratory: CTABL, no respiratory distress   Abdomen: soft, ND, NT, nonperitonitic  MSK/Vascular: full ROM, soft compartments, warm extremities  Neuro: AAOx3, sensation to light touch intact  Psych: calm, cooperative

## 2021-07-04 NOTE — ED ADULT NURSE NOTE - OBJECTIVE STATEMENT
pt comes [facility] ED from [facility], 18 days sober from cocaine, reporting he was relaxing and playing a board game when he developed chest pain and pressure and then some left shoulder pain. no SOB, reports hx PE over 10 years ago. pt reports no fevers, no recent illness, states no other drug use. arrives tachycardic hypotensive, afebrile rectally, abd soft non tender non distended, even and unlabored resps present. ER MD at bedside for an immediate evaluation. pt with no extremity edema noted on exam.

## 2021-07-04 NOTE — CONSULT NOTE ADULT - ASSESSMENT
This is a 61 years old man with h/o cocaine abuse, BPH, Thalasemia, and PE. He now presents with RSCP and found to be in regular monomorphic NCT. Cardiology and EPS consulted. CT chest showed no dissection. He denies any prior palpitation, or syncope. He had one episode of postural dizziness one week ago    currently he feels minor fluttering in his chest after he was told that his HR is fast    Adenosine 6 mg Iv given resulting in termination of the tachycardia with QRS    Impression  1. SVT, mildly symptomatic. This is his first episode. EKG are most c/w JT which is often a secondary to an acute systemic illness rather than primary rhythm problem. DDx include AVNRT and AT as well as AVRT. Nonetheless, this is resolved now.   - No indication to start specific therapy now.  - EP consult either as inpatient or outpatient if recurrent SVT    2. CP  -high risk for CAD. Also had h/o PEs. Cardiology will see the patient for this    EPS sign off now, call us if needed  above d/w pt and ED   
61 years old man with h/o cocaine abuse, BPH, Thalasemia, and PE. He now presents with RSCP and found to be in regular monomorphic NCT.     1.SVT, mildly symptomatic.   - S/P  Adenosine 6 mg Iv given resulting in termination of the tachycardia with QRS  - This is his first episode. EKG are most c/w JT which is often a secondary to an acute systemic illness rather than primary rhythm problem. DDx include AVNRT and AT as well as AVRT. Nonetheless, this is resolved now.   - No indication to start specific therapy now.  - EP consult either as inpatient or outpatient if recurrent SVT  - TSH within normal limits     2. Atypical Chest pain   - notes no chest pain at this time and none prior to onset of SVT   - patient has had prior cardiac work up which included a stress test and subsequent angiogram in 2015 which was negative for any obstructive disease   - Trop negative x 2   - obtain TTE to assess LV function and valvulopathy and if no evidence of wall motion abnormalities can be discharged home   - in the outpatient setitng at follow up will obtain [facility]   - check Lipid Panel and Hgb a1c     Gayle Merrill D.O. [facility]  Cardiology/Vascular Cardiology -[facility]   Telephone # [phone]

## 2021-07-04 NOTE — ED PROVIDER NOTE - PROGRESS NOTE DETAILS
Given the significant and immediate threats to this patient based on initial presentation, the benefits of emergency contrast-enhanced CT imaging without obtaining GFR/creatinine serum level results greatly outweigh the potential risk of harm due to contrast-induced nephropathy. Robert: Labs/CT read pending. Robert: I Rechecked the patient and updated him on plan for cards eval Robert:  cards was paged. I rechecked the patient. . Denies chest pain. Reports continued left shoulder/arm pain. Robert: EP saw patient at bedside and discussed recs with Dr. Liu. Adenosine 6 mg IV given with resolution of tachycardia/junctional rhythm. Patient now in NSR in 80s. Moved from critical to B6L. SS cardiology eval pending. Rashawn COOPER: patient signed out to me. trop neg x 2. Pending TTE. As per Cardiology if trop neg x 2 and TTE WNL can go home. [facility]OPER[facility] in AM.

## 2021-07-04 NOTE — ED CDU PROVIDER INITIAL DAY NOTE - FAMILY HISTORY
Mother  Still living? Unknown  Family history of thalassemia, Age at diagnosis: Age Unknown     Father  Still living? Unknown  Family history of MI (myocardial infarction), Age at diagnosis: Age Unknown  Family history of CHF (congestive heart failure), Age at diagnosis: Age Unknown

## 2021-07-04 NOTE — ED PROVIDER NOTE - NS ED ROS FT
Constitutional: no fever, no chills  Head: NC, AT   Eyes: no redness   ENMT: no nasal congestion/drainage, no sore throat   CV: +chest pain, no edema  Resp: no cough, no dyspnea  GI: no abdominal pain, no nausea, no vomiting, no diarrhea  : no dysuria, no hematuria   MSK: +left shoulder pain, +low back pain 2 days ago  Skin: no lesions, no rashes   Neuro: no LOC, no headache, no sensory deficits, no weakness

## 2021-07-04 NOTE — ED PROVIDER NOTE - ATTENDING CONTRIBUTION TO CARE
I performed a face to face history and physical exam of the patient and discussed their management with the student/resident/ACP. I reviewed the student/resident/ACP's note and agree with the documented findings and plan of care.    Pt states that he was at [employer] today finishing a game of [employer] when he had onset of L chest pressure that went to his shoulder. no cp. no n/v. no fever/chills. no other complaints. Pt last used cocaine 18 d ago.  remote hx of PE not on AC.    physical- tachy regular. ctab. abd - soft, nt. no edema. no rash.    plan - labs and ekg reviewed. Cards and EP evaluated patient. Pt was in junctional rhythm that responded to adenosine and is now in NSR.  plan for repeat trop and TTE. if neg plan to d/c with outpatient cards f/up.

## 2021-07-04 NOTE — ED ADULT TRIAGE NOTE - CHIEF COMPLAINT QUOTE
pt A&OX 4 BIBA from home c/o midsternal chest pain radiating to left arm with SOB  x 1 hour. Pt reports hx of PE's. Received 4 baby ASA by EMS with no relief. 2nd pfizer vaccine received 6/22. pt A&OX 4 BIBA from home c/o midsternal chest pain radiating to left arm with SOB  x 1 hour. Pt reports hx of PE's. Received 4 baby ASA by EMS with no relief. 2nd pfizer vaccine received 6/22. Dr Liu called to eval and pt brought to critical care. pt A&OX 4 BIBA from [facility] c/o midsternal chest pain radiating to left arm with SOB  x 1 hour. Pt reports hx of PE's. Received 4 baby ASA by EMS with no relief. 2nd pfizer vaccine received 6/22. Dr Liu called to eval and pt brought to critical care.

## 2021-07-04 NOTE — ED PROVIDER NOTE - CLINICAL SUMMARY MEDICAL DECISION MAKING FREE TEXT BOX
61 [facility] with history of PE and BPH who presents with midsternal chest pain. Labs without acute findings - trop neg x 1 and repeat pending. CTA of chest negative for dissection and no clinically significant PE. EP consulted and felt presentation most c/w SVT. Patient given adenosine and converted to NSR in 80s. SS Cardiology consulted *** 61 year old male [facility] with history of PE and BPH who presents with midsternal chest pain. Labs without acute findings - trop neg x 2. CTA of chest negative for dissection and no clinically significant PE. EP consulted and felt presentation most c/w SVT. Patient given adenosine and converted to NSR in 80s. SS Cardiology consulted and recommending TTE which is pending at time of signout.

## 2021-07-04 NOTE — CONSULT NOTE ADULT - SUBJECTIVE AND OBJECTIVE BOX
Electrophysiology Attending Consult Note    HPI:  This is a 61 years old man with h/o cocaine abuse, BPH, Thalasemia, and PE. He now presents with RSCP and found to be in regular monomorphic NCT. Cardiology and EPS consulted. CT chest showed no dissection. He denies any prior palpitation, or syncope. He had one episode of postural dizziness one week ago    currently he feels minor fluttering in his chest after he was told that his HR is fast    PAST MEDICAL & SURGICAL HISTORY:  Thalassanemia, minor  Post traumatic stress disorder  Enlarged prostate  PE (pulmonary thromboembolism), not on AC for ? reasons    H/O left knee surgery  Jaw fracture        REVIEW OF SYSTEMS:    CONSTITUTIONAL: No fever, weight loss, or fatigue  RESPIRATORY: No cough, wheezing, chills or hemoptysis; No shortness of breath  CARDIOVASCULAR: as above   GASTROINTESTINAL: No abdominal or epigastric pain. No nausea, vomiting, or hematemesis; No diarrhea or constipation. No melena or hematochezia.  GENITOURINARY: No dysuria, frequency, hematuria, or incontinence  NEUROLOGICAL: No headaches, memory loss, loss of strength, numbness, or tremors  SKIN: No itching, burning, rashes, or lesions   MUSCULOSKELETAL: No joint pain or swelling; No muscle, back, or extremity pain      MEDICATIONS  (STANDING):  acetaminophen   Tablet .. 975 milliGRAM(s) Oral once  aDENosine Injectable (ADENOCARD) 6 milliGRAM(s) IV Push once    MEDICATIONS  (PRN):      Allergies    No Known Drug Allergies  Nuts (Hives)  peanuts (Anaphylaxis)    Intolerances        SOCIAL HISTORY:  h/o of cocaine abuse  no alcohol or smoking    FAMILY HISTORY:  eliu had MI in his 40s    Vital Signs Last 24 Hrs  T(C): 36.9 (04 Jul 2021 15:15), Max: 36.9 (04 Jul 2021 15:15)  T(F): 98.4 (04 Jul 2021 15:15), Max: 98.4 (04 Jul 2021 15:15)  HR: 116 (04 Jul 2021 15:45) (115 - 130)  BP: 106/62 (04 Jul 2021 15:45) (89/54 - 106/62)  BP(mean): --  RR: 20 (04 Jul 2021 15:45) (20 - 22)  SpO2: 98% (04 Jul 2021 15:45) (95% - 99%)    Physical Exam:  Constitutional: AAOx3, NAD  Neck: supple, No JVD  Cardiovascular: +S1S2 RRR, no murmurs, rubs, gallops   Pulmonary: CTA b/l, unlabored, no wheezes, rales. rhonci  Abdomen: soft NTND  Extremities: no edema b/l, +distal pulses b/l  Neuro: non focal, speech clear, CLOUD x 4    LABS:                        12.0   4.06  )-----------( 181      ( 04 Jul 2021 15:02 )             37.2   07-04    138  |  104  |  15.0  ----------------------------<  77  5.6<H>   |  24.0  |  1.06    Ca    8.5<L>      04 Jul 2021 15:02  Mg     2.0     07-04    TPro  6.6  /  Alb  3.7  /  TBili  0.4  /  DBili  x   /  AST  33  /  ALT  26  /  AlkPhos  83  07-04  LIVER FUNCTIONS - ( 04 Jul 2021 15:02 )  Alb: 3.7 g/dL / Pro: 6.6 g/dL / ALK PHOS: 83 U/L / ALT: 26 U/L / AST: 33 U/L / GGT: x           PT/INR - ( 04 Jul 2021 15:02 )   PT: 12.9 sec;   INR: 1.12 ratio         PTT - ( 04 Jul 2021 15:02 )  PTT:29.1 secCARDIAC MARKERS ( 04 Jul 2021 15:02 )  x     / <0.01 ng/mL / x     / x     / x              RADIOLOGY & ADDITIONAL STUDIES:  Tele and EKGs: Regular NCT with ? retrograde P wave at the end of the QRS at rates 110-135 bpm    < from: CT Angio Abdomen and Pelvis w/ IV Cont (07.04.21 @ 16:14) >    IMPRESSION: No aortic dissection.    < end of copied text >

## 2021-07-04 NOTE — ED PROVIDER NOTE - OBJECTIVE STATEMENT
61 [facility] with history of PE and BPH who presents with midsternal chest pain radiating to left shoulder. Denies shortness of breath, nausea, and cough. Last cocaine use 20 days ago. Non smoker. No history of HTN or HLD. 61 year old male [facility] with history of PE and BPH who presents with midsternal chest pain radiating to left shoulder. Denies shortness of breath, nausea, and cough. Last cocaine use 20 days ago. No recent extended immobilizations. Non smoker. No history of HTN or HLD. 61 year old [facility] with history of PE and BPH who presents with midsternal chest pain radiating to left shoulder. Denies shortness of breath, nausea, and cough. Last cocaine use 20 days ago. No recent extended immobilizations. Non smoker. No history of HTN or HLD. No significant family history.

## 2021-07-04 NOTE — ED CDU PROVIDER INITIAL DAY NOTE - OBJECTIVE STATEMENT
60 yo male PMHx Bipolar Disorder, former cocaine abuse, insomnia, BPH, PE (no anticoagulation) [facility] c/o chest pain. Pain to midsternum, with radiation to left shoulder, but believes pain 2/2 ?torn rotator cuff. Last cocaine use 20 days ago.   Denies shortness of breath, nausea, and cough. No recent extended immobilizations. Non smoker. No history of HTN or HLD. 62 yo male PMHx Bipolar Disorder, former cocaine abuse, insomnia, BPH, PE (no anticoagulation) [facility] c/o chest pain while playing Jenga. Pain to midsternum, with radiation to left shoulder, but believes pain 2/2 ?torn rotator cuff. Last cocaine use 20 days ago.   Denies shortness of breath, nausea, and cough. No recent extended immobilizations. Non smoker. No history of HTN or HLD.

## 2021-07-04 NOTE — ED CDU PROVIDER INITIAL DAY NOTE - PMH
Cocaine abuse    Enlarged prostate    Insomnia    PE (pulmonary thromboembolism)    Post traumatic stress disorder    Thalassanemia, minor

## 2021-07-04 NOTE — ED ADULT NURSE NOTE - CAS ELECT INFOMATION PROVIDED
DC instructions provided and reviewed with pt. Patient in understanding of all dc instructions. No further questions for the RN. VSS. Ambulatory./DC instructions

## 2021-07-04 NOTE — ED PROVIDER NOTE - PMH
Enlarged prostate    PE (pulmonary thromboembolism)    Post traumatic stress disorder    Thalassanemia, minor

## 2021-07-04 NOTE — ED CDU PROVIDER INITIAL DAY NOTE - ATTENDING CONTRIBUTION TO CARE
Rashawn: I performed a face to face bedside interview with patient regarding history of present illness, review of symptoms and past medical history. I completed an independent physical exam.  I have discussed patient's plan of care with advanced care provider.   I agree with note as stated above including HISTORY OF PRESENT ILLNESS, HIV, PAST MEDICAL/SURGICAL/FAMILY/SOCIAL HISTORY, ALLERGIES AND HOME MEDICATIONS, REVIEW OF SYSTEMS, PHYSICAL EXAM, MEDICAL DECISION MAKING and any PROGRESS NOTES during the time I functioned as the attending physician for this patient  unless otherwise noted. My brief assessment is as follows: Patient with history of cocaine use p/w CP. Originally in junctional rhythm, resolved with adenosine. Seen by EP and cardiology. trop negative x 2. Placed in CDU for TTE.

## 2021-07-04 NOTE — CONSULT NOTE ADULT - SUBJECTIVE AND OBJECTIVE BOX
[place] [facility]                                                               Office:  [address] [address]                                                              Telephone: [phone]. Fax:[phone]                                                                        CARDIOLOGY CONSULTATION NOTE                                                                                             Consult requested by:    Reason for Consultation:   History obtained by: Patient and medical record   obtained: No    Chief complaint:    Patient is a 61y old  Male who presents with a chief complaint of CP (04 Jul 2021 17:03)        HPI:        REVIEW OF SYMPTOMS:     CONSTITUTIONAL: No fever, weight loss, or fatigue  ENMT:  No difficulty hearing, tinnitus, vertigo; No sinus or throat pain  NECK: No pain or stiffness  CARDIOVASCULAR: No chest pain, dyspnea, syncope, palpitations, dizziness, Orthopnea, Paroxsymal nocturnal dyspnea  RESPIRATORY: No Dyspnea on exertion, Shortness of breath, cough, wheezing  : No dysuria, no hematuria   GI: No dark color stool, no melena, no diarrhea, no constipation, no abdominal pain   NEURO: No headache, no dizziness, no slurred speech   MUSCULOSKELETAL: No joint pain or swelling; No muscle, back, or extremity pain  PSYCH: No agitation, no anxiety.    ALL OTHER REVIEW OF SYSTEMS ARE NEGATIVE.      PREVIOUS DIAGNOSTIC TESTING  ECHO FINDINGS:      STRESS FINDINGS:      CATHETERIZATION FINDINGS:         ALLERGIES: Allergies    No Known Drug Allergies  Nuts (Hives)  peanuts (Anaphylaxis)    Intolerances          PAST MEDICAL HISTORY  Thalassanemia, minor    Post traumatic stress disorder    Enlarged prostate    PE (pulmonary thromboembolism)        PAST SURGICAL HISTORY  H/O left knee surgery    Jaw fracture        FAMILY HISTORY:      SOCIAL HISTORY:  Denies smoking/alcohol/drugs  CIGARETTES:     ALCOHOL:  DRUGS:      CURRENT MEDICATIONS:  aDENosine Injectable (ADENOCARD) 6 milliGRAM(s) IV Push once     acetaminophen   Tablet ..        HOME MEDICATIONS:      Vital Signs Last 24 Hrs  T(C): 36.9 (04 Jul 2021 15:15), Max: 36.9 (04 Jul 2021 15:15)  T(F): 98.4 (04 Jul 2021 15:15), Max: 98.4 (04 Jul 2021 15:15)  HR: 90 (04 Jul 2021 17:30) (88 - 130)  BP: 117/64 (04 Jul 2021 17:30) (89/54 - 117/64)  BP(mean): --  RR: 20 (04 Jul 2021 17:30) (20 - 22)  SpO2: 98% (04 Jul 2021 17:30) (95% - 99%)      PHYSICAL EXAM:  Constitutional: Comfortable . No acute distress.   HEENT: Atraumatic and normocephalic , neck is supple . no JVD. No carotid bruit. PEERL   CNS: A&Ox3. No focal deficits. EOMI. Cranial nerves II-IX are intact.   Lymph Nodes: Cervical : Not palpable.  Respiratory: CTAB  Cardiovascular: S1S2 RRR. No murmur/rubs or gallop.  Gastrointestinal: Soft non-tender and non distended . +Bowel sounds. negative Nath's sign.  Extremities: No edema.   Psychiatric: Calm . no agitation.  Skin: No skin rash/ulcers visualized to face, hands or feet.    Intake and output:     LABS:                        12.0   4.06  )-----------( 181      ( 04 Jul 2021 15:02 )             37.2     07-04    138  |  104  |  15.0  ----------------------------<  77  5.6<H>   |  24.0  |  1.06    Ca    8.5<L>      04 Jul 2021 15:02  Mg     2.0     07-04    TPro  6.6  /  Alb  3.7  /  TBili  0.4  /  DBili  x   /  AST  33  /  ALT  26  /  AlkPhos  83  07-04    CARDIAC MARKERS ( 04 Jul 2021 15:02 )  x     / <0.01 ng/mL / x     / x     / x        ;p-BNP=Serum Pro-Brain Natriuretic Peptide: 66 pg/mL (07-04 @ 15:02)    PT/INR - ( 04 Jul 2021 15:02 )   PT: 12.9 sec;   INR: 1.12 ratio         PTT - ( 04 Jul 2021 15:02 )  PTT:29.1 sec      INTERPRETATION OF TELEMETRY: Reviewed by me.   ECG: Reviewed by me.     RADIOLOGY & ADDITIONAL STUDIES:    X-ray:  reviewed by me.   CT scan:   MRI:                                                                          [place] [facility]                                                               Office:  [address] [address]                                                              Telephone: [phone]. Fax:[phone]                                                                        CARDIOLOGY CONSULTATION NOTE                                                                                             Consult requested by:  ER   Reason for Consultation: regular monomorphic NCT  History obtained by: Patient and medical record   obtained: No    Chief complaint:    Patient is a 61y old  Male who presents with a chief complaint of CP (04 Jul 2021 17:03)        HPI:61 years old man with h/o cocaine abuse, BPH, Thalasemia, and PE. He now presents with RSCP and found to be in regular monomorphic NCT. Cardiology and EPS consulted. CT chest showed no dissection. He denies any prior palpitation, or syncope. He had one episode of postural dizziness one week ago. Patient s/p adenosine and converted to sinus rhythm; patient notes that he is feeling better.   Patient used to follow with [facility] cardiology and notes that he hasn't seen them since 2015 and is going to follow with [facility]; he further notes that he had a stress test and subsequent LHC which was negative for obstructive disease this was done at [facility]. Otherwise at [facility] no chest pain or shortness of breath at rest or upon exertion and states that he exercises regularly and no limitation in functional capacity. He notes no orthopnea PND         REVIEW OF SYMPTOMS:     CONSTITUTIONAL: No fever, weight loss, or fatigue  ENMT:  No difficulty hearing, tinnitus, vertigo; No sinus or throat pain  NECK: No pain or stiffness  CARDIOVASCULAR: No chest pain, dyspnea, syncope, palpitations, dizziness, Orthopnea, Paroxsymal nocturnal dyspnea  RESPIRATORY: No Dyspnea on exertion, Shortness of breath, cough, wheezing  : No dysuria, no hematuria   GI: No dark color stool, no melena, no diarrhea, no constipation, no abdominal pain   NEURO: No headache, no dizziness, no slurred speech   MUSCULOSKELETAL: No joint pain or swelling; No muscle, back, or extremity pain  PSYCH: No agitation, no anxiety.    ALL OTHER REVIEW OF SYSTEMS ARE NEGATIVE.      PREVIOUS DIAGNOSTIC TESTING  ECHO FINDINGS:      STRESS FINDINGS:      CATHETERIZATION FINDINGS:         ALLERGIES: Allergies    No Known Drug Allergies  Nuts (Hives)  peanuts (Anaphylaxis)    Intolerances          PAST MEDICAL HISTORY  Thalassanemia, minor    Post traumatic stress disorder    Enlarged prostate    PE (pulmonary thromboembolism)        PAST SURGICAL HISTORY  H/O left knee surgery    Jaw fracture        FAMILY HISTORY:      SOCIAL HISTORY:  Denies smoking/alcohol/drugs  CIGARETTES:     ALCOHOL:  DRUGS:      CURRENT MEDICATIONS:  aDENosine Injectable (ADENOCARD) 6 milliGRAM(s) IV Push once     acetaminophen   Tablet ..        HOME MEDICATIONS:      Vital Signs Last 24 Hrs  T(C): 36.9 (04 Jul 2021 15:15), Max: 36.9 (04 Jul 2021 15:15)  T(F): 98.4 (04 Jul 2021 15:15), Max: 98.4 (04 Jul 2021 15:15)  HR: 90 (04 Jul 2021 17:30) (88 - 130)  BP: 117/64 (04 Jul 2021 17:30) (89/54 - 117/64)  BP(mean): --  RR: 20 (04 Jul 2021 17:30) (20 - 22)  SpO2: 98% (04 Jul 2021 17:30) (95% - 99%)      PHYSICAL EXAM:  Constitutional: Comfortable . No acute distress.   HEENT: Atraumatic and normocephalic , neck is supple . no JVD. No carotid bruit. PEERL   CNS: A&Ox3. No focal deficits. EOMI. Cranial nerves II-IX are intact.   Lymph Nodes: Cervical : Not palpable.  Respiratory: CTAB  Cardiovascular: S1S2 RRR. No murmur/rubs or gallop.  Gastrointestinal: Soft non-tender and non distended . +Bowel sounds. negative Nath's sign.  Extremities: No edema.   Psychiatric: Calm . no agitation.  Skin: No skin rash/ulcers visualized to face, hands or feet.    Intake and output:     LABS:                        12.0   4.06  )-----------( 181      ( 04 Jul 2021 15:02 )             37.2     07-04    138  |  104  |  15.0  ----------------------------<  77  5.6<H>   |  24.0  |  1.06    Ca    8.5<L>      04 Jul 2021 15:02  Mg     2.0     07-04    TPro  6.6  /  Alb  3.7  /  TBili  0.4  /  DBili  x   /  AST  33  /  ALT  26  /  AlkPhos  83  07-04    CARDIAC MARKERS ( 04 Jul 2021 15:02 )  x     / <0.01 ng/mL / x     / x     / x        ;p-BNP=Serum Pro-Brain Natriuretic Peptide: 66 pg/mL (07-04 @ 15:02)    PT/INR - ( 04 Jul 2021 15:02 )   PT: 12.9 sec;   INR: 1.12 ratio         PTT - ( 04 Jul 2021 15:02 )  PTT:29.1 sec      INTERPRETATION OF TELEMETRY: Reviewed by me.   ECG: Reviewed by me.     RADIOLOGY & ADDITIONAL STUDIES:    X-ray:  reviewed by me.   CT scan:   MRI:

## 2021-07-04 NOTE — ED CDU PROVIDER INITIAL DAY NOTE - MEDICAL DECISION MAKING DETAILS
62 yo male PMHx Bipolar Disorder, former cocaine abuse, insomnia, BPH, PE (no anticoagulation) [facility] c/o chest pain. Initial EKG junctional tachycardia resolved with adenosine. Initially hypotensive improved with IVF. Labs negative and CTA chest/abd/pelv negative for dissection. Evaluated by EP (no intervention) and cardiology. Patient placed in CDU pending TTE.

## 2021-07-04 NOTE — ED ADULT NURSE NOTE - CHIEF COMPLAINT QUOTE
pt A&OX 4 BIBA from [facility] c/o midsternal chest pain radiating to left arm with SOB  x 1 hour. Pt reports hx of PE's. Received 4 baby ASA by EMS with no relief. 2nd pfizer vaccine received 6/22. Dr Liu called to eval and pt brought to critical care.

## 2021-07-05 VITALS
RESPIRATION RATE: 18 BRPM | DIASTOLIC BLOOD PRESSURE: 79 MMHG | TEMPERATURE: 98 F | HEART RATE: 87 BPM | SYSTOLIC BLOOD PRESSURE: 133 MMHG | OXYGEN SATURATION: 98 %

## 2021-07-05 LAB
ANION GAP SERPL CALC-SCNC: 6 MMOL/L — SIGNIFICANT CHANGE UP (ref 5–17)
BUN SERPL-MCNC: 11.1 MG/DL — SIGNIFICANT CHANGE UP (ref 8–20)
CALCIUM SERPL-MCNC: 8.6 MG/DL — SIGNIFICANT CHANGE UP (ref 8.6–10.2)
CHLORIDE SERPL-SCNC: 107 MMOL/L — SIGNIFICANT CHANGE UP (ref 98–107)
CO2 SERPL-SCNC: 27 MMOL/L — SIGNIFICANT CHANGE UP (ref 22–29)
CREAT SERPL-MCNC: 0.91 MG/DL — SIGNIFICANT CHANGE UP (ref 0.5–1.3)
GLUCOSE SERPL-MCNC: 79 MG/DL — SIGNIFICANT CHANGE UP (ref 70–99)
POTASSIUM SERPL-MCNC: 4.3 MMOL/L — SIGNIFICANT CHANGE UP (ref 3.5–5.3)
POTASSIUM SERPL-SCNC: 4.3 MMOL/L — SIGNIFICANT CHANGE UP (ref 3.5–5.3)
SODIUM SERPL-SCNC: 140 MMOL/L — SIGNIFICANT CHANGE UP (ref 135–145)
TROPONIN T SERPL-MCNC: <0.01 NG/ML — SIGNIFICANT CHANGE UP (ref 0–0.06)

## 2021-07-05 PROCEDURE — 86900 BLOOD TYPING SEROLOGIC ABO: CPT

## 2021-07-05 PROCEDURE — 84484 ASSAY OF TROPONIN QUANT: CPT

## 2021-07-05 PROCEDURE — 99291 CRITICAL CARE FIRST HOUR: CPT | Mod: 25

## 2021-07-05 PROCEDURE — 93005 ELECTROCARDIOGRAM TRACING: CPT

## 2021-07-05 PROCEDURE — 84436 ASSAY OF TOTAL THYROXINE: CPT

## 2021-07-05 PROCEDURE — 86850 RBC ANTIBODY SCREEN: CPT

## 2021-07-05 PROCEDURE — 83880 ASSAY OF NATRIURETIC PEPTIDE: CPT

## 2021-07-05 PROCEDURE — 80053 COMPREHEN METABOLIC PANEL: CPT

## 2021-07-05 PROCEDURE — G0378: CPT

## 2021-07-05 PROCEDURE — 82962 GLUCOSE BLOOD TEST: CPT

## 2021-07-05 PROCEDURE — 85730 THROMBOPLASTIN TIME PARTIAL: CPT

## 2021-07-05 PROCEDURE — 93306 TTE W/DOPPLER COMPLETE: CPT

## 2021-07-05 PROCEDURE — 71045 X-RAY EXAM CHEST 1 VIEW: CPT

## 2021-07-05 PROCEDURE — 84443 ASSAY THYROID STIM HORMONE: CPT

## 2021-07-05 PROCEDURE — 99214 OFFICE O/P EST MOD 30 MIN: CPT

## 2021-07-05 PROCEDURE — 85025 COMPLETE CBC W/AUTO DIFF WBC: CPT

## 2021-07-05 PROCEDURE — 36415 COLL VENOUS BLD VENIPUNCTURE: CPT

## 2021-07-05 PROCEDURE — 85610 PROTHROMBIN TIME: CPT

## 2021-07-05 PROCEDURE — 74174 CTA ABD&PLVS W/CONTRAST: CPT

## 2021-07-05 PROCEDURE — 80048 BASIC METABOLIC PNL TOTAL CA: CPT

## 2021-07-05 PROCEDURE — 93306 TTE W/DOPPLER COMPLETE: CPT | Mod: 26

## 2021-07-05 PROCEDURE — 86901 BLOOD TYPING SEROLOGIC RH(D): CPT

## 2021-07-05 PROCEDURE — 71275 CT ANGIOGRAPHY CHEST: CPT

## 2021-07-05 PROCEDURE — 96374 THER/PROPH/DIAG INJ IV PUSH: CPT | Mod: XU

## 2021-07-05 PROCEDURE — 83735 ASSAY OF MAGNESIUM: CPT

## 2021-07-05 NOTE — ED ADULT NURSE REASSESSMENT NOTE - NS ED NURSE REASSESS COMMENT FT1
Report received from SUNDAR Richter for continuity of care. Calm and cooperative from [facility], last cocaine use 20 days PTA, , denies CP, SOB or palpitations at this time. VSS, 2 neg CE's, for TTE in the morning. P<H of PE's 15+ years ago off anticoagulation for many years. Snacks provided. Safety maintained. Will continue to monitor.
pt remains AOX3, placed on defib pads as well as nasal cannula and prepped for cardioversion. EP and ER MD at bedside, pt given Adenosine and converted to NSR.
pt remains AOX3, remains in NSR on cardiac monitor with more stable vitals at this time. even and unlabored resps, awaiting echo for final dispo. repeat trop sent ss ordered.
raudel ESTRADA at bedside for evaluation. pt remains in junctional tach rhythm, BP more stable at this time. pt with even and unlabored resps present, pt remains afebrile.
Assumed care of the patient at 0730. Patient A&Ox4. No s/s of acute distress. Denies CP/SOB or palpitations. NSR on CM. PIV patent. Patient pending rpt trop and TTE testing. Patient in understanding of plan of care. Patient with no further questions for the RN. Resting in comfort. Call bell within reach and encouraged to use when assistance needed. Will continue to monitor.

## 2021-07-05 NOTE — ED CDU PROVIDER SUBSEQUENT DAY NOTE - ATTENDING CONTRIBUTION TO CARE
seen on morning rounds: reports "feeling great".  History reviewed:  presented with progressively worsening palpitations with assoc left should discomfort  and SOB.  Found to have SVT: converted with adenosine 6mg.  Seen by cardiology and EP: placed on obs for serial trop and echo.  Pt reports no prior episodes of palps.  PE:  Nontoxic appearing, NAD.  Echo completed and largely unremarkable.  serial trop neg x2.  Cardiology note reviewed.  Stable for dc and continuing work-up in outpatient office.

## 2021-07-05 NOTE — PROGRESS NOTE ADULT - SUBJECTIVE AND OBJECTIVE BOX
[facility]                                                               Office: [address] [address]                                                              Telephone: [phone]. Fax:[phone]                                                                             PROGRESS NOTE  Reason for follow up:   Overnight: No new events.   Update:     Subjective: "  ______________________"      	  Vitals:  T(C): 36.5 (07-05-21 @ 09:18), Max: 36.9 (07-04-21 @ 15:15)  HR: 87 (07-05-21 @ 09:18) (71 - 130)  BP: 133/79 (07-05-21 @ 09:18) (89/54 - 133/79)  RR: 18 (07-05-21 @ 09:18) (16 - 22)  SpO2: 98% (07-05-21 @ 09:18) (95% - 100%)  Wt(kg): --  I&O's Summary    Weight (kg): 84.4 (07-04 @ 14:35)      PHYSICAL EXAM:  Appearance: Comfortable. No acute distress  HEENT:  Head and neck: Atraumatic. Normocephalic.  Normal oral mucosa, PERRL, Neck is supple. No JVD, No carotid bruit.   Neurologic: A & O x 3, no focal deficits. EOMI.  Lymphatic: No cervical lymphadenopathy  Cardiovascular: Normal S1 S2, No murmur, rubs/gallops. No JVD, No edema  Respiratory: Lungs clear to auscultation  Gastrointestinal:  Soft, Non-tender, + BS  Lower Extremities: No edema  Psychiatry: Patient is calm. No agitation. Mood & affect appropriate  Skin: No rashes/ ecchymoses/cyanosis/ulcers visualized on the face, hands or feet.      CURRENT MEDICATIONS:  tamsulosin 0.8 milliGRAM(s) Oral daily    risperiDONE   Tablet  folic acid      DIAGNOSTIC TESTING:  [x ] Echocardiogram:   < from: TTE Echo Complete w/o Contrast w/ Doppler (07.05.21 @ 08:05) >    Summary:   1. Left ventricular ejection fraction, by visual estimation, is 60 to 65%.   2. Normal global left ventricular systolic function.   3. Spectral Doppler shows impaired relaxation pattern of left ventricular myocardial filling (Grade I diastolic dysfunction).   4. Mildly enlarged left atrium.   5. Normal right ventricular size and function.   6. Mild mitral valve regurgitation.   7. Mild tricuspid regurgitation.   8. Normal trileaflet aortic valve with normal opening.   9. Intra-atrial septal aneurysm.  10. There is no evidence of pericardial effusion.  11. There are no prior studies on this patient for comparison purposes.    Gayle Merrill D.O., Electronically signed on 7/5/2021 at 8:56:54 AM    < end of copied text >    [ ]  Catheterization:  [ ] Stress Test:    OTHER: 	      LABS:	 	  CARDIAC MARKERS ( 05 Jul 2021 07:51 )  x     / <0.01 ng/mL / x     / x     / x      p-BNP 05 Jul 2021 07:51: x    , CARDIAC MARKERS ( 04 Jul 2021 18:23 )  x     / <0.01 ng/mL / x     / x     / x      p-BNP 04 Jul 2021 18:23: x    , CARDIAC MARKERS ( 04 Jul 2021 15:02 )  x     / <0.01 ng/mL / x     / x     / x      p-BNP 04 Jul 2021 15:02: 66 pg/mL                          12.0   4.06  )-----------( 181      ( 04 Jul 2021 15:02 )             37.2     07-05    140  |  107  |  11.1  ----------------------------<  79  4.3   |  27.0  |  0.91    Ca    8.6      05 Jul 2021 07:51  Mg     2.0     07-04    TPro  6.6  /  Alb  3.7  /  TBili  0.4  /  DBili  x   /  AST  33  /  ALT  26  /  AlkPhos  83  07-04    proBNP: Serum Pro-Brain Natriuretic Peptide: 66 pg/mL (07-04 @ 15:02)    Lipid Profile:   HgA1c:   TSH: Thyroid Stimulating Hormone, Serum: 1.70 uIU/mL        TELEMETRY: Reviewed    ECG:  Reviewed by me. 	                                                                      [place] [facility]                                                               Office: [address] [address]                                                              Telephone: [phone]. Fax:[phone]                                                                             PROGRESS NOTE  Reason for follow up: regular monomorphic NCT  Overnight: No new events.   Update:   No chest pain or shortness of breath overnight   Subjective: "  ______________________"      	  Vitals:  T(C): 36.5 (07-05-21 @ 09:18), Max: 36.9 (07-04-21 @ 15:15)  HR: 87 (07-05-21 @ 09:18) (71 - 130)  BP: 133/79 (07-05-21 @ 09:18) (89/54 - 133/79)  RR: 18 (07-05-21 @ 09:18) (16 - 22)  SpO2: 98% (07-05-21 @ 09:18) (95% - 100%)  Wt(kg): --  I&O's Summary    Weight (kg): 84.4 (07-04 @ 14:35)      PHYSICAL EXAM:  Appearance: Comfortable. No acute distress  HEENT:  Head and neck: Atraumatic. Normocephalic.  Normal oral mucosa, PERRL, Neck is supple. No JVD, No carotid bruit.   Neurologic: A & O x 3, no focal deficits. EOMI.  Lymphatic: No cervical lymphadenopathy  Cardiovascular: Normal S1 S2, No murmur, rubs/gallops. No JVD, No edema  Respiratory: Lungs clear to auscultation  Gastrointestinal:  Soft, Non-tender, + BS  Lower Extremities: No edema  Psychiatry: Patient is calm. No agitation. Mood & affect appropriate  Skin: No rashes/ ecchymoses/cyanosis/ulcers visualized on the face, hands or feet.      CURRENT MEDICATIONS:  tamsulosin 0.8 milliGRAM(s) Oral daily    risperiDONE   Tablet  folic acid      DIAGNOSTIC TESTING:  [x ] Echocardiogram:   < from: TTE Echo Complete w/o Contrast w/ Doppler (07.05.21 @ 08:05) >    Summary:   1. Left ventricular ejection fraction, by visual estimation, is 60 to 65%.   2. Normal global left ventricular systolic function.   3. Spectral Doppler shows impaired relaxation pattern of left ventricular myocardial filling (Grade I diastolic dysfunction).   4. Mildly enlarged left atrium.   5. Normal right ventricular size and function.   6. Mild mitral valve regurgitation.   7. Mild tricuspid regurgitation.   8. Normal trileaflet aortic valve with normal opening.   9. Intra-atrial septal aneurysm.  10. There is no evidence of pericardial effusion.  11. There are no prior studies on this patient for comparison purposes.    Gayle Merrill D.O., Electronically signed on 7/5/2021 at 8:56:54 AM    < end of copied text >    [ ]  Catheterization:  [ ] Stress Test:    OTHER: 	      LABS:	 	  CARDIAC MARKERS ( 05 Jul 2021 07:51 )  x     / <0.01 ng/mL / x     / x     / x      p-BNP 05 Jul 2021 07:51: x    , CARDIAC MARKERS ( 04 Jul 2021 18:23 )  x     / <0.01 ng/mL / x     / x     / x      p-BNP 04 Jul 2021 18:23: x    , CARDIAC MARKERS ( 04 Jul 2021 15:02 )  x     / <0.01 ng/mL / x     / x     / x      p-BNP 04 Jul 2021 15:02: 66 pg/mL                          12.0   4.06  )-----------( 181      ( 04 Jul 2021 15:02 )             37.2     07-05    140  |  107  |  11.1  ----------------------------<  79  4.3   |  27.0  |  0.91    Ca    8.6      05 Jul 2021 07:51  Mg     2.0     07-04    TPro  6.6  /  Alb  3.7  /  TBili  0.4  /  DBili  x   /  AST  33  /  ALT  26  /  AlkPhos  83  07-04    proBNP: Serum Pro-Brain Natriuretic Peptide: 66 pg/mL (07-04 @ 15:02)    Lipid Profile:   HgA1c:   TSH: Thyroid Stimulating Hormone, Serum: 1.70 uIU/mL        TELEMETRY: Reviewed    ECG:  Reviewed by me.

## 2021-07-05 NOTE — PROGRESS NOTE ADULT - ASSESSMENT
61 years old man with h/o cocaine abuse, BPH, Thalasemia, and PE. He now presents with RSCP and found to be in regular monomorphic NCT.     1.SVT, mildly symptomatic.   - S/P  Adenosine 6 mg Iv given resulting in termination of the tachycardia with QRS  - This is his first episode. EKG are most c/w JT which is often a secondary to an acute systemic illness rather than primary rhythm problem. DDx include AVNRT and AT as well as AVRT. Nonetheless, this is resolved now.   - No indication to start specific therapy now.  - EP consult either as inpatient or outpatient if recurrent SVT  - TSH within normal limits     2. Atypical Chest pain   - notes no chest pain at this time and none prior to onset of SVT   - patient has had prior cardiac work up which included a stress test and subsequent angiogram in 2015 which was negative for any obstructive disease   - Trop negative x 2   - TTE reviewed and no evidence of wall motion abnormalities can be discharged home   - in the outpatient setting at follow up [facility]   - check Lipid Panel and Hgb a1c     Gayle Merrill D.O. [facility]  Cardiology/Vascular Cardiology -[facility]   Telephone # [phone]

## 2021-07-05 NOTE — ED CDU PROVIDER DISPOSITION NOTE - NSFOLLOWUPCLINICS_GEN_ALL_ED_FT
[facility]  Cardiology  [address], [address]  [place] [address]  Phone: [phone]  Fax:

## 2021-07-05 NOTE — ED CDU PROVIDER DISPOSITION NOTE - PATIENT PORTAL LINK FT
You can access the FollowMyHealth Patient Portal offered by [facility] by registering at the following website: [email or web address]. By joining [employer]’s FollowMyHealth portal, you will also be able to view your health information using other applications (apps) compatible with our system.

## 2021-07-05 NOTE — ED CDU PROVIDER SUBSEQUENT DAY NOTE - MEDICAL DECISION MAKING DETAILS
60 yo male PMHx Bipolar Disorder, former cocaine abuse, insomnia, BPH, PE (no anticoagulation) [facility] c/o chest pain. Initial EKG junctional tachycardia resolved with adenosine. Initially hypotensive improved with IVF. Labs negative and CTA chest/abd/pelv negative for dissection. Evaluated by EP (no intervention) and cardiology. Patient placed in CDU pending TTE.

## 2021-07-05 NOTE — ED CDU PROVIDER DISPOSITION NOTE - CLINICAL COURSE
61 year old male [facility]  [facility] with history of PE and BPH who presents with midsternal chest pain radiating to left shoulder and heart palpitations.  Last cocaine use 20 days ago. Pt found to be in a narrow tachycardia given one dose of adenosine with resolution, Pt placed in obs for further eval, Pt with neg work up Pt cleared by cards for out pt follow up, educated about when to return to the ED if needed. PT verbalizes that he understands all instructions and results. Pt informed that ED is open and available 24/7 365 days a yr, encouraged to return to the ED if they have any change in condition, or feel the need for revaluation.

## 2021-07-05 NOTE — ED CDU PROVIDER DISPOSITION NOTE - ATTENDING CONTRIBUTION TO CARE
presented in SVT: converted with adenosine 6mg.  Placed on observation for serial troponin and echo.  Troponin negative.  Echo largely unremarkable.  Seen by cardiology and EP.  Cleared for discharge and further mgmt in outpt office.

## 2021-07-05 NOTE — ED CDU PROVIDER DISPOSITION NOTE - NSFOLLOWUPINSTRUCTIONS_ED_ALL_ED_FT
Patient education: Supraventricular tachycardia (SVT) (The Basics)  View in Slovak  Written by the doctors and editors at [facility]  What is supraventricular tachycardia (SVT)?  Supraventricular tachycardia, also called "SVT," is a heartbeat that is faster than normal. It usually starts and stops suddenly, without warning. SVT is also called "paroxysmal supraventricular tachycardia" or PSVT. (Paroxysmal means a sudden attack.)    SVT happens because of a problem with the heart's electrical system. It starts in the upper chambers of the heart, called the "atria" (figure 1). The fast heartbeat can last from a few minutes to hours, but usually lasts for 10 to 15 minutes. It often happens when you are at rest. But in some people, exercise triggers it.    The 3 main types of SVT are called:    ?Atrioventricular mabel reentry tachycardia (AVNRT)    ?Atrioventricular reentry tachycardia (AVRT)    ?Atrial tachycardia    What are the symptoms of SVT?  You might not have symptoms. But you might feel that your heart is beating too fast, beating hard, or seems to skip a beat. These kinds of heartbeat changes are called "palpitations."    You might also feel:    ?Lightheaded    ?Dizzy    ?Very tired    If you also have coronary heart disease, you might also:    ?Have trouble breathing    ?Feel a tightness in your chest    Should I see a doctor or nurse?  If you have trouble breathing or have chest pain that lasts for more than a few minutes, call for an ambulance (in the [place] and [place], dial [id number]).    If you do not have these problems, but you often feel your heart beating fast or irregularly, talk to your doctor or nurse.    Is there a test for supraventricular tachycardia?  Yes. Your doctor or nurse will do a test called an electrocardiogram, also known as an "ECG." This test measures the electrical activity in your heart (figure 2).    Other possible tests include various types of longer-term heart monitoring. This can be done using one of several devices.    ?Holter monitor – This is a small, portable machine you wear that records all your heart's electrical activity over 1 or 2 days (figure 3).    ?Event or loop monitor – These are similar to Holter monitors but smaller, because they don't record all the time. Instead, you start the monitor when you feel symptoms (figure 4). Your doctor will likely have you wear this type of monitor every day for about a month.    ?Patch monitor – This is a newer type of monitor. It goes directly on the skin, without wires, and can be worn for up to 14 days. You wear it all the time while you go about your usual activities.    How is supraventricular tachycardia treated?  The treatment depends on the cause of the tachycardia. When your heartbeat is very fast, your doctor might suggest ways to slow it down. They might have you cough, or bear down as if you're having a bowel movement. Doing these things can affect the nerve that helps control your heartbeat.    Other treatments can include:    ?Medicines to control the speed or rhythm of your heartbeat    ?A treatment called "cardioversion" – This involves applying a mild electrical current to the heart to fix its rhythm.    ?Treatments called "ablation" – These treatments destroy the small part of the heart that is sending the abnormal electrical signals. They can use heat (called "radiofrequency ablation") or cold (called "cryoablation") to do this.

## 2021-07-15 PROBLEM — F14.10 COCAINE ABUSE, UNCOMPLICATED: Chronic | Status: ACTIVE | Noted: 2021-07-04

## 2021-07-15 PROBLEM — I26.99 OTHER PULMONARY EMBOLISM WITHOUT ACUTE COR PULMONALE: Chronic | Status: ACTIVE | Noted: 2021-07-04

## 2021-07-15 PROBLEM — G47.00 INSOMNIA, UNSPECIFIED: Chronic | Status: ACTIVE | Noted: 2021-07-04

## 2021-07-16 ENCOUNTER — OUTPATIENT (OUTPATIENT)
Dept: OUTPATIENT SERVICES | Facility: HOSPITAL | Age: 61
LOS: 1 days | End: 2021-07-16
Payer: COMMERCIAL

## 2021-07-16 ENCOUNTER — APPOINTMENT (OUTPATIENT)
Age: 61
End: 2021-07-16
Payer: MEDICAID

## 2021-07-16 DIAGNOSIS — G47.33 OBSTRUCTIVE SLEEP APNEA (ADULT) (PEDIATRIC): ICD-10-CM

## 2021-07-16 DIAGNOSIS — Z98.89 OTHER SPECIFIED POSTPROCEDURAL STATES: Chronic | ICD-10-CM

## 2021-07-16 DIAGNOSIS — S02.609A FRACTURE OF MANDIBLE, UNSPECIFIED, INITIAL ENCOUNTER FOR CLOSED FRACTURE: Chronic | ICD-10-CM

## 2021-07-16 PROCEDURE — 95806 SLEEP STUDY UNATT&RESP EFFT: CPT

## 2021-07-16 PROCEDURE — 95806 SLEEP STUDY UNATT&RESP EFFT: CPT | Mod: 26

## 2021-07-20 ENCOUNTER — APPOINTMENT (OUTPATIENT)
Dept: PULMONOLOGY | Facility: CLINIC | Age: 61
End: 2021-07-20

## 2021-12-31 ENCOUNTER — OUTPATIENT (OUTPATIENT)
Dept: OUTPATIENT SERVICES | Facility: HOSPITAL | Age: 61
LOS: 1 days | End: 2021-12-31
Payer: COMMERCIAL

## 2021-12-31 DIAGNOSIS — S02.609A FRACTURE OF MANDIBLE, UNSPECIFIED, INITIAL ENCOUNTER FOR CLOSED FRACTURE: Chronic | ICD-10-CM

## 2021-12-31 DIAGNOSIS — Z20.828 CONTACT WITH AND (SUSPECTED) EXPOSURE TO OTHER VIRAL COMMUNICABLE DISEASES: ICD-10-CM

## 2021-12-31 DIAGNOSIS — Z98.89 OTHER SPECIFIED POSTPROCEDURAL STATES: Chronic | ICD-10-CM

## 2021-12-31 LAB — SARS-COV-2 RNA SPEC QL NAA+PROBE: SIGNIFICANT CHANGE UP

## 2021-12-31 PROCEDURE — U0005: CPT

## 2021-12-31 PROCEDURE — U0003: CPT

## 2022-07-11 ENCOUNTER — APPOINTMENT (OUTPATIENT)
Dept: FAMILY MEDICINE | Facility: CLINIC | Age: 62
End: 2022-07-11

## 2022-08-01 ENCOUNTER — APPOINTMENT (OUTPATIENT)
Dept: FAMILY MEDICINE | Facility: CLINIC | Age: 62
End: 2022-08-01

## 2023-02-22 ENCOUNTER — EMERGENCY (EMERGENCY)
Facility: HOSPITAL | Age: 63
LOS: 1 days | Discharge: HOME | End: 2023-02-22
Attending: STUDENT IN AN ORGANIZED HEALTH CARE EDUCATION/TRAINING PROGRAM
Payer: COMMERCIAL

## 2023-02-22 VITALS
OXYGEN SATURATION: 99 % | SYSTOLIC BLOOD PRESSURE: 137 MMHG | DIASTOLIC BLOOD PRESSURE: 78 MMHG | TEMPERATURE: 98 F | HEART RATE: 81 BPM | RESPIRATION RATE: 20 BRPM

## 2023-02-22 VITALS — HEIGHT: 71 IN | WEIGHT: 190.04 LBS

## 2023-02-22 DIAGNOSIS — S02.609A FRACTURE OF MANDIBLE, UNSPECIFIED, INITIAL ENCOUNTER FOR CLOSED FRACTURE: Chronic | ICD-10-CM

## 2023-02-22 DIAGNOSIS — Z98.89 OTHER SPECIFIED POSTPROCEDURAL STATES: Chronic | ICD-10-CM

## 2023-02-22 LAB
ALBUMIN SERPL ELPH-MCNC: 4.2 G/DL — SIGNIFICANT CHANGE UP (ref 3.3–5.2)
ALP SERPL-CCNC: 66 U/L — SIGNIFICANT CHANGE UP (ref 40–120)
ALT FLD-CCNC: 21 U/L — SIGNIFICANT CHANGE UP
ANION GAP SERPL CALC-SCNC: 11 MMOL/L — SIGNIFICANT CHANGE UP (ref 5–17)
ANISOCYTOSIS BLD QL: SLIGHT — SIGNIFICANT CHANGE UP
AST SERPL-CCNC: 23 U/L — SIGNIFICANT CHANGE UP
BASOPHILS # BLD AUTO: 0.01 K/UL — SIGNIFICANT CHANGE UP (ref 0–0.2)
BASOPHILS NFR BLD AUTO: 0.2 % — SIGNIFICANT CHANGE UP (ref 0–2)
BILIRUB SERPL-MCNC: 1.1 MG/DL — SIGNIFICANT CHANGE UP (ref 0.4–2)
BUN SERPL-MCNC: 12.2 MG/DL — SIGNIFICANT CHANGE UP (ref 8–20)
CALCIUM SERPL-MCNC: 8.5 MG/DL — SIGNIFICANT CHANGE UP (ref 8.4–10.5)
CHLORIDE SERPL-SCNC: 103 MMOL/L — SIGNIFICANT CHANGE UP (ref 96–108)
CO2 SERPL-SCNC: 26 MMOL/L — SIGNIFICANT CHANGE UP (ref 22–29)
CREAT SERPL-MCNC: 0.96 MG/DL — SIGNIFICANT CHANGE UP (ref 0.5–1.3)
EGFR: 88 ML/MIN/1.73M2 — SIGNIFICANT CHANGE UP
ELLIPTOCYTES BLD QL SMEAR: SLIGHT — SIGNIFICANT CHANGE UP
EOSINOPHIL # BLD AUTO: 0.07 K/UL — SIGNIFICANT CHANGE UP (ref 0–0.5)
EOSINOPHIL NFR BLD AUTO: 1.7 % — SIGNIFICANT CHANGE UP (ref 0–6)
GLUCOSE SERPL-MCNC: 74 MG/DL — SIGNIFICANT CHANGE UP (ref 70–99)
HCT VFR BLD CALC: 42.8 % — SIGNIFICANT CHANGE UP (ref 39–50)
HGB BLD-MCNC: 13.7 G/DL — SIGNIFICANT CHANGE UP (ref 13–17)
IMM GRANULOCYTES NFR BLD AUTO: 0 % — SIGNIFICANT CHANGE UP (ref 0–0.9)
LIDOCAIN IGE QN: 18 U/L — LOW (ref 22–51)
LYMPHOCYTES # BLD AUTO: 0.88 K/UL — LOW (ref 1–3.3)
LYMPHOCYTES # BLD AUTO: 21.9 % — SIGNIFICANT CHANGE UP (ref 13–44)
MANUAL SMEAR VERIFICATION: SIGNIFICANT CHANGE UP
MCHC RBC-ENTMCNC: 22.8 PG — LOW (ref 27–34)
MCHC RBC-ENTMCNC: 32 GM/DL — SIGNIFICANT CHANGE UP (ref 32–36)
MCV RBC AUTO: 71.3 FL — LOW (ref 80–100)
MONOCYTES # BLD AUTO: 0.41 K/UL — SIGNIFICANT CHANGE UP (ref 0–0.9)
MONOCYTES NFR BLD AUTO: 10.2 % — SIGNIFICANT CHANGE UP (ref 2–14)
NEUTROPHILS # BLD AUTO: 2.64 K/UL — SIGNIFICANT CHANGE UP (ref 1.8–7.4)
NEUTROPHILS NFR BLD AUTO: 66 % — SIGNIFICANT CHANGE UP (ref 43–77)
OVALOCYTES BLD QL SMEAR: SLIGHT — SIGNIFICANT CHANGE UP
PLAT MORPH BLD: NORMAL — SIGNIFICANT CHANGE UP
PLATELET # BLD AUTO: 154 K/UL — SIGNIFICANT CHANGE UP (ref 150–400)
POIKILOCYTOSIS BLD QL AUTO: SLIGHT — SIGNIFICANT CHANGE UP
POTASSIUM SERPL-MCNC: 3.7 MMOL/L — SIGNIFICANT CHANGE UP (ref 3.5–5.3)
POTASSIUM SERPL-SCNC: 3.7 MMOL/L — SIGNIFICANT CHANGE UP (ref 3.5–5.3)
PROT SERPL-MCNC: 7.5 G/DL — SIGNIFICANT CHANGE UP (ref 6.6–8.7)
RBC # BLD: 6 M/UL — HIGH (ref 4.2–5.8)
RBC # FLD: 15.7 % — HIGH (ref 10.3–14.5)
RBC BLD AUTO: ABNORMAL
SODIUM SERPL-SCNC: 140 MMOL/L — SIGNIFICANT CHANGE UP (ref 135–145)
TARGETS BLD QL SMEAR: SLIGHT — SIGNIFICANT CHANGE UP
TROPONIN T SERPL-MCNC: <0.01 NG/ML — SIGNIFICANT CHANGE UP (ref 0–0.06)
WBC # BLD: 4.01 K/UL — SIGNIFICANT CHANGE UP (ref 3.8–10.5)
WBC # FLD AUTO: 4.01 K/UL — SIGNIFICANT CHANGE UP (ref 3.8–10.5)

## 2023-02-22 PROCEDURE — 99285 EMERGENCY DEPT VISIT HI MDM: CPT | Mod: 25

## 2023-02-22 PROCEDURE — 74177 CT ABD & PELVIS W/CONTRAST: CPT | Mod: MD

## 2023-02-22 PROCEDURE — 96375 TX/PRO/DX INJ NEW DRUG ADDON: CPT

## 2023-02-22 PROCEDURE — 93005 ELECTROCARDIOGRAM TRACING: CPT

## 2023-02-22 PROCEDURE — 84484 ASSAY OF TROPONIN QUANT: CPT

## 2023-02-22 PROCEDURE — 93010 ELECTROCARDIOGRAM REPORT: CPT

## 2023-02-22 PROCEDURE — 85025 COMPLETE CBC W/AUTO DIFF WBC: CPT

## 2023-02-22 PROCEDURE — 36415 COLL VENOUS BLD VENIPUNCTURE: CPT

## 2023-02-22 PROCEDURE — 74177 CT ABD & PELVIS W/CONTRAST: CPT | Mod: 26,MD

## 2023-02-22 PROCEDURE — 99285 EMERGENCY DEPT VISIT HI MDM: CPT

## 2023-02-22 PROCEDURE — 80053 COMPREHEN METABOLIC PANEL: CPT

## 2023-02-22 PROCEDURE — 96374 THER/PROPH/DIAG INJ IV PUSH: CPT

## 2023-02-22 PROCEDURE — 96361 HYDRATE IV INFUSION ADD-ON: CPT

## 2023-02-22 PROCEDURE — 83690 ASSAY OF LIPASE: CPT

## 2023-02-22 RX ORDER — TAMSULOSIN HYDROCHLORIDE 0.4 MG/1
0 CAPSULE ORAL
Qty: 0 | Refills: 0 | DISCHARGE

## 2023-02-22 RX ORDER — PANTOPRAZOLE SODIUM 20 MG/1
1 TABLET, DELAYED RELEASE ORAL
Qty: 14 | Refills: 0
Start: 2023-02-22 | End: 2023-03-07

## 2023-02-22 RX ORDER — FAMOTIDINE 10 MG/ML
20 INJECTION INTRAVENOUS ONCE
Refills: 0 | Status: COMPLETED | OUTPATIENT
Start: 2023-02-22 | End: 2023-02-22

## 2023-02-22 RX ORDER — RISPERIDONE 4 MG/1
1 TABLET ORAL
Qty: 0 | Refills: 0 | DISCHARGE

## 2023-02-22 RX ORDER — ACETAMINOPHEN 500 MG
1000 TABLET ORAL ONCE
Refills: 0 | Status: COMPLETED | OUTPATIENT
Start: 2023-02-22 | End: 2023-02-22

## 2023-02-22 RX ORDER — FOLIC ACID 0.8 MG
1 TABLET ORAL
Qty: 0 | Refills: 0 | DISCHARGE

## 2023-02-22 RX ORDER — SODIUM CHLORIDE 9 MG/ML
1000 INJECTION INTRAMUSCULAR; INTRAVENOUS; SUBCUTANEOUS ONCE
Refills: 0 | Status: COMPLETED | OUTPATIENT
Start: 2023-02-22 | End: 2023-02-22

## 2023-02-22 RX ORDER — HYDROXYZINE HCL 10 MG
1 TABLET ORAL
Qty: 0 | Refills: 0 | DISCHARGE

## 2023-02-22 RX ADMIN — SODIUM CHLORIDE 1000 MILLILITER(S): 9 INJECTION INTRAMUSCULAR; INTRAVENOUS; SUBCUTANEOUS at 17:03

## 2023-02-22 RX ADMIN — Medication 1 TABLET(S): at 21:24

## 2023-02-22 RX ADMIN — Medication 400 MILLIGRAM(S): at 17:02

## 2023-02-22 RX ADMIN — FAMOTIDINE 20 MILLIGRAM(S): 10 INJECTION INTRAVENOUS at 17:02

## 2023-02-22 NOTE — ED ADULT NURSE NOTE - OBJECTIVE STATEMENT
62M nad aaox3 c/o cold symptoms onset Saturday and abdominal pain onset Monday night and c/o diarrhea, denies nausea, denies vomiting, reports subjective fever. Pt with clear speech, even and unlabored breathing, 62M nad aaox3 c/o cold symptoms onset Saturday and abdominal pain onset Monday night and c/o diarrhea, denies nausea, denies vomiting, reports subjective fever. Pt with clear speech, even and unlabored breathing, calm, cooperative.

## 2023-02-22 NOTE — ED STATDOCS - PATIENT PORTAL LINK FT
You can access the FollowMyHealth Patient Portal offered by [facility] by registering at the following website: [email or web address]. By joining [employer]’s FollowMyHealth portal, you will also be able to view your health information using other applications (apps) compatible with our system.

## 2023-02-22 NOTE — ED STATDOCS - NSICDXFAMILYHX_GEN_ALL_CORE_FT
FAMILY HISTORY:  Father  Still living? Unknown  Family history of CHF (congestive heart failure), Age at diagnosis: Age Unknown  Family history of MI (myocardial infarction), Age at diagnosis: Age Unknown    Mother  Still living? Unknown  Family history of thalassemia, Age at diagnosis: Age Unknown

## 2023-02-22 NOTE — ED ADULT NURSE NOTE - SUICIDE SCREENING QUESTION 3
Quality 110: Preventive Care And Screening: Influenza Immunization: Influenza Immunization Administered during Influenza season Quality 130: Documentation Of Current Medications In The Medical Record: Current Medications with Name, Dosage, Frequency, or Route not Documented, Reason not Given Quality 226: Preventive Care And Screening: Tobacco Use: Screening And Cessation Intervention: Patient screened for tobacco use and is an ex/non-smoker Detail Level: Detailed No

## 2023-02-22 NOTE — ED STATDOCS - ATTENDING APP SHARED VISIT CONTRIBUTION OF CARE
I, Nelly Snowden, performed a face to face bedside interview with this patient regarding history of present illness, review of symptoms and relevant past medical, social and family history.  I completed an independent physical examination. Medical decision making, follow-up on ordered tests (ie labs, radiologic studies) and re-evaluation of the patient's status has been communicated to the ACP.  Disposition of the patient will be based on test outcome and response to ED interventions.

## 2023-02-22 NOTE — ED STATDOCS - NSICDXPASTMEDICALHX_GEN_ALL_CORE_FT
PAST MEDICAL HISTORY:  Cocaine abuse     Enlarged prostate     Insomnia     PE (pulmonary thromboembolism)     Post traumatic stress disorder     Thalassanemia, minor

## 2023-02-22 NOTE — ED STATDOCS - PHYSICAL EXAMINATION
Vital Signs per nursing documentation  Gen: well appearing, no acute distress  HEENT: NCAT, MMM  Cardiac: regular rate rhythm, normal S1S2  Chest: clear to auscultation bilateral, no wheezes or crackles  Abdomen: LLQ tenderness. soft, non distended  Extremity: no gross deformity, good perfusion  Skin: no rash  Neuro: nonfocal neuro exam, gait steady

## 2023-02-22 NOTE — ED STATDOCS - CARE PROVIDER_API CALL
Cielo Dukes (DO)  Gastroenterology  [address], [address]  [place] [address]  Phone: [phone]  Fax: [phone]  Follow Up Time:     Vannessa Cook)  Urology  [address], [facility] [[email or web address]] [id number]  [place] [address]  Phone: [phone]  Fax: [phone]  Follow Up Time:

## 2023-02-22 NOTE — ED STATDOCS - NS ED ATTENDING STATEMENT MOD
This was a shared visit with the DAYNE. I reviewed and verified the documentation and independently performed the documented:

## 2023-07-19 ENCOUNTER — APPOINTMENT (OUTPATIENT)
Dept: NEUROLOGY | Facility: CLINIC | Age: 63
End: 2023-07-19
Payer: MEDICAID

## 2023-07-19 VITALS — WEIGHT: 185 LBS | BODY MASS INDEX: 25.9 KG/M2 | HEIGHT: 71 IN

## 2023-07-19 PROCEDURE — 99204 OFFICE O/P NEW MOD 45 MIN: CPT

## 2023-07-19 NOTE — ASSESSMENT
[FreeTextEntry1] : Long history of concentration difficulties\par His examination is normal.\par Possible ADD or ADHD\par Wants to be further evaluated\par We will do an EEG and refer him for formal neuropsychological evaluation\par I have also recommended that he follow-up with a sleep specialist.

## 2023-07-19 NOTE — PHYSICAL EXAM
[General Appearance - Alert] : alert [General Appearance - In No Acute Distress] : in no acute distress [General Appearance - Well-Appearing] : healthy appearing [Oriented To Time, Place, And Person] : oriented to person, place, and time [Impaired Insight] : insight and judgment were intact [Affect] : the affect was normal [Memory Recent] : recent memory was not impaired [Person] : oriented to person [Place] : oriented to place [Time] : oriented to time [Concentration Intact] : normal concentrating ability [Fluency] : fluency intact [Comprehension] : comprehension intact [Past History] : adequate knowledge of personal past history [Cranial Nerves Optic (II)] : visual acuity intact bilaterally,  visual fields full to confrontation, pupils equal round and reactive to light [Cranial Nerves Oculomotor (III)] : extraocular motion intact [Cranial Nerves Trigeminal (V)] : facial sensation intact symmetrically [Cranial Nerves Facial (VII)] : face symmetrical [Cranial Nerves Vestibulocochlear (VIII)] : hearing was intact bilaterally [Cranial Nerves Glossopharyngeal (IX)] : tongue and palate midline [Cranial Nerves Accessory (XI - Cranial And Spinal)] : head turning and shoulder shrug symmetric [Cranial Nerves Hypoglossal (XII)] : there was no tongue deviation with protrusion [Motor Tone] : muscle tone was normal in all four extremities [Motor Strength] : muscle strength was normal in all four extremities [No Muscle Atrophy] : normal bulk in all four extremities [Paresis Pronator Drift Right-Sided] : no pronator drift on the right [Paresis Pronator Drift Left-Sided] : no pronator drift on the left [Sensation Tactile Decrease] : light touch was intact [Sensation Pain / Temperature Decrease] : pain and temperature was intact [Romberg's Sign] : Romberg's sign was negtive [Abnormal Walk] : normal gait [Balance] : balance was intact [Past-pointing] : there was no past-pointing [Tremor] : no tremor present [Coordination - Dysmetria Impaired Finger-to-Nose Bilateral] : not present [Coordination - Dysmetria Impaired Heel-to-Shin Bilateral] : not present [2+] : Ankle jerk left 2+ [Plantar Reflex Right Only] : normal on the right [Plantar Reflex Left Only] : normal on the left [PERRL With Normal Accommodation] : pupils were equal in size, round, reactive to light, with normal accommodation [Extraocular Movements] : extraocular movements were intact [Full Visual Field] : full visual field

## 2023-07-19 NOTE — HISTORY OF PRESENT ILLNESS
[FreeTextEntry1] : He  reports trouble concentrating and focusing since childhood.  He was suspected to have ADHD as a child but was never tested.  Says he wants to be further evaluated.  He did graduate high school and went to some college.  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  He is a recovering cocaine addict last use 14 months ago.  No significant alcohol use\par \par Has history of hyperlipidemia and hypertension\par \par Also has been diagnosed with sleep apnea but has not followed up with a sleep specialist in a long time

## 2023-07-19 NOTE — CONSULT LETTER
[Dear  ___] : Dear  [unfilled], [Consult Letter:] : I had the pleasure of evaluating your patient, [unfilled]. [Please see my note below.] : Please see my note below. [Consult Closing:] : Thank you very much for allowing me to participate in the care of this patient.  If you have any questions, please do not hesitate to contact me. [Sincerely,] : Sincerely, [FreeTextEntry3] : Ruiz Kaufman MD, PhD, DPN-N\par [facility]\par Neurology at [place]\par Chief, Division of Neurology\par [facility]\par

## 2023-07-22 ENCOUNTER — NON-APPOINTMENT (OUTPATIENT)
Age: 63
End: 2023-07-22

## 2023-07-23 ENCOUNTER — EMERGENCY (EMERGENCY)
Facility: HOSPITAL | Age: 63
LOS: 0 days | Discharge: HOME | End: 2023-07-24
Attending: STUDENT IN AN ORGANIZED HEALTH CARE EDUCATION/TRAINING PROGRAM
Payer: MEDICARE

## 2023-07-23 VITALS — HEIGHT: 70 IN | WEIGHT: 184.97 LBS

## 2023-07-23 DIAGNOSIS — R53.83 OTHER FATIGUE: ICD-10-CM

## 2023-07-23 DIAGNOSIS — R07.89 OTHER CHEST PAIN: ICD-10-CM

## 2023-07-23 DIAGNOSIS — D56.3 THALASSEMIA MINOR: ICD-10-CM

## 2023-07-23 DIAGNOSIS — Z91.018 ALLERGY TO OTHER FOODS: ICD-10-CM

## 2023-07-23 DIAGNOSIS — Z86.718 PERSONAL HISTORY OF OTHER VENOUS THROMBOSIS AND EMBOLISM: ICD-10-CM

## 2023-07-23 DIAGNOSIS — R60.9 EDEMA, UNSPECIFIED: ICD-10-CM

## 2023-07-23 DIAGNOSIS — F43.10 POST-TRAUMATIC STRESS DISORDER, UNSPECIFIED: ICD-10-CM

## 2023-07-23 DIAGNOSIS — Z91.148 PATIENT'S OTHER NONCOMPLIANCE WITH MEDICATION REGIMEN FOR OTHER REASON: ICD-10-CM

## 2023-07-23 DIAGNOSIS — Z91.010 ALLERGY TO PEANUTS: ICD-10-CM

## 2023-07-23 DIAGNOSIS — S02.609A FRACTURE OF MANDIBLE, UNSPECIFIED, INITIAL ENCOUNTER FOR CLOSED FRACTURE: Chronic | ICD-10-CM

## 2023-07-23 DIAGNOSIS — Z98.89 OTHER SPECIFIED POSTPROCEDURAL STATES: Chronic | ICD-10-CM

## 2023-07-23 LAB
ALBUMIN SERPL ELPH-MCNC: 3.3 G/DL — SIGNIFICANT CHANGE UP (ref 3.3–5)
ALP SERPL-CCNC: 80 U/L — SIGNIFICANT CHANGE UP (ref 40–120)
ALT FLD-CCNC: 29 U/L — SIGNIFICANT CHANGE UP (ref 12–78)
ANION GAP SERPL CALC-SCNC: 3 MMOL/L — LOW (ref 5–17)
ANISOCYTOSIS BLD QL: SLIGHT — SIGNIFICANT CHANGE UP
APTT BLD: 31 SEC — SIGNIFICANT CHANGE UP (ref 27.5–35.5)
AST SERPL-CCNC: 17 U/L — SIGNIFICANT CHANGE UP (ref 15–37)
BASOPHILS # BLD AUTO: 0.03 K/UL — SIGNIFICANT CHANGE UP (ref 0–0.2)
BASOPHILS NFR BLD AUTO: 0.7 % — SIGNIFICANT CHANGE UP (ref 0–2)
BILIRUB SERPL-MCNC: 0.7 MG/DL — SIGNIFICANT CHANGE UP (ref 0.2–1.2)
BUN SERPL-MCNC: 12 MG/DL — SIGNIFICANT CHANGE UP (ref 7–23)
CALCIUM SERPL-MCNC: 8.5 MG/DL — SIGNIFICANT CHANGE UP (ref 8.5–10.1)
CHLORIDE SERPL-SCNC: 109 MMOL/L — HIGH (ref 96–108)
CO2 SERPL-SCNC: 31 MMOL/L — SIGNIFICANT CHANGE UP (ref 22–31)
CREAT SERPL-MCNC: 1.08 MG/DL — SIGNIFICANT CHANGE UP (ref 0.5–1.3)
EGFR: 76 ML/MIN/1.73M2 — SIGNIFICANT CHANGE UP
ELLIPTOCYTES BLD QL SMEAR: SLIGHT — SIGNIFICANT CHANGE UP
EOSINOPHIL # BLD AUTO: 0.31 K/UL — SIGNIFICANT CHANGE UP (ref 0–0.5)
EOSINOPHIL NFR BLD AUTO: 7.7 % — HIGH (ref 0–6)
GLUCOSE SERPL-MCNC: 70 MG/DL — SIGNIFICANT CHANGE UP (ref 70–99)
HCT VFR BLD CALC: 41 % — SIGNIFICANT CHANGE UP (ref 39–50)
HGB BLD-MCNC: 13.1 G/DL — SIGNIFICANT CHANGE UP (ref 13–17)
IMM GRANULOCYTES NFR BLD AUTO: 0.2 % — SIGNIFICANT CHANGE UP (ref 0–0.9)
INR BLD: 1.24 RATIO — HIGH (ref 0.88–1.16)
LG PLATELETS BLD QL AUTO: SLIGHT — SIGNIFICANT CHANGE UP
LYMPHOCYTES # BLD AUTO: 1.64 K/UL — SIGNIFICANT CHANGE UP (ref 1–3.3)
LYMPHOCYTES # BLD AUTO: 40.5 % — SIGNIFICANT CHANGE UP (ref 13–44)
MAGNESIUM SERPL-MCNC: 2.1 MG/DL — SIGNIFICANT CHANGE UP (ref 1.6–2.6)
MANUAL SMEAR VERIFICATION: SIGNIFICANT CHANGE UP
MCHC RBC-ENTMCNC: 23.3 PG — LOW (ref 27–34)
MCHC RBC-ENTMCNC: 32 GM/DL — SIGNIFICANT CHANGE UP (ref 32–36)
MCV RBC AUTO: 73 FL — LOW (ref 80–100)
MICROCYTES BLD QL: SIGNIFICANT CHANGE UP
MONOCYTES # BLD AUTO: 0.45 K/UL — SIGNIFICANT CHANGE UP (ref 0–0.9)
MONOCYTES NFR BLD AUTO: 11.1 % — SIGNIFICANT CHANGE UP (ref 2–14)
NEUTROPHILS # BLD AUTO: 1.61 K/UL — LOW (ref 1.8–7.4)
NEUTROPHILS NFR BLD AUTO: 39.8 % — LOW (ref 43–77)
NT-PROBNP SERPL-SCNC: 10 PG/ML — SIGNIFICANT CHANGE UP (ref 0–125)
PLAT MORPH BLD: NORMAL — SIGNIFICANT CHANGE UP
PLATELET # BLD AUTO: 167 K/UL — SIGNIFICANT CHANGE UP (ref 150–400)
POIKILOCYTOSIS BLD QL AUTO: SLIGHT — SIGNIFICANT CHANGE UP
POTASSIUM SERPL-MCNC: 3.4 MMOL/L — LOW (ref 3.5–5.3)
POTASSIUM SERPL-SCNC: 3.4 MMOL/L — LOW (ref 3.5–5.3)
PROT SERPL-MCNC: 6.9 GM/DL — SIGNIFICANT CHANGE UP (ref 6–8.3)
PROTHROM AB SERPL-ACNC: 14.4 SEC — HIGH (ref 10.5–13.4)
RBC # BLD: 5.62 M/UL — SIGNIFICANT CHANGE UP (ref 4.2–5.8)
RBC # FLD: 15.9 % — HIGH (ref 10.3–14.5)
RBC BLD AUTO: ABNORMAL
SODIUM SERPL-SCNC: 143 MMOL/L — SIGNIFICANT CHANGE UP (ref 135–145)
TROPONIN I, HIGH SENSITIVITY RESULT: 3.9 NG/L — SIGNIFICANT CHANGE UP
WBC # BLD: 4.05 K/UL — SIGNIFICANT CHANGE UP (ref 3.8–10.5)
WBC # FLD AUTO: 4.05 K/UL — SIGNIFICANT CHANGE UP (ref 3.8–10.5)

## 2023-07-23 PROCEDURE — 85025 COMPLETE CBC W/AUTO DIFF WBC: CPT

## 2023-07-23 PROCEDURE — 93010 ELECTROCARDIOGRAM REPORT: CPT

## 2023-07-23 PROCEDURE — 99285 EMERGENCY DEPT VISIT HI MDM: CPT | Mod: 25

## 2023-07-23 PROCEDURE — 85730 THROMBOPLASTIN TIME PARTIAL: CPT

## 2023-07-23 PROCEDURE — 93005 ELECTROCARDIOGRAM TRACING: CPT

## 2023-07-23 PROCEDURE — 85610 PROTHROMBIN TIME: CPT

## 2023-07-23 PROCEDURE — 80053 COMPREHEN METABOLIC PANEL: CPT

## 2023-07-23 PROCEDURE — 99285 EMERGENCY DEPT VISIT HI MDM: CPT

## 2023-07-23 PROCEDURE — 71045 X-RAY EXAM CHEST 1 VIEW: CPT | Mod: 26

## 2023-07-23 PROCEDURE — 36415 COLL VENOUS BLD VENIPUNCTURE: CPT

## 2023-07-23 PROCEDURE — 83880 ASSAY OF NATRIURETIC PEPTIDE: CPT

## 2023-07-23 PROCEDURE — 84484 ASSAY OF TROPONIN QUANT: CPT

## 2023-07-23 PROCEDURE — 83735 ASSAY OF MAGNESIUM: CPT

## 2023-07-23 PROCEDURE — 71045 X-RAY EXAM CHEST 1 VIEW: CPT

## 2023-07-23 NOTE — ED PROVIDER NOTE - OBJECTIVE STATEMENT
Patient is a 63 year old male with a PMHx of PE, thalassanemia, enlarged prostate, insomnia, cocaine abuse, PTSD; presenting to the ED c/o chest pain feels like "pressure" and lower extremity edema in the ankles and feet for the past couple of weeks. Endorses fatigue. Denies associated SOB, CP. Cardio at [facility]. Denies EtOH and recreational drug use.

## 2023-07-23 NOTE — ED ADULT NURSE NOTE - OBJECTIVE STATEMENT
Pt presents to ED c/o chest pressure and bilateral ankle swelling. Pt states he was sent by urgent care for further evaluation. Pt states the chest pressure has been on and off for several weeks. Pt endorses hx of PE. 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  Pt denies ETOH/Drug use today. Pt is Aox4 and speaking in full sentences. Pt appears lethargic in the ED but states it r/t long day of work. Pt PIV placed with no issues and EKG done at bedside.

## 2023-07-23 NOTE — ED PROVIDER NOTE - PATIENT PORTAL LINK FT
You can access the FollowMyHealth Patient Portal offered by [facility] by registering at the following website: [email or web address]. By joining [facility]’s FollowMyHealth portal, you will also be able to view your health information using other applications (apps) compatible with our system.

## 2023-07-23 NOTE — ED PROVIDER NOTE - CLINICAL SUMMARY MEDICAL DECISION MAKING FREE TEXT BOX
63-year-old male noncompliant with medical follow-up presents to the emergency department for chest pain and lower extremity swelling.  Patient states the symptoms have been going on for the past few weeks, chest pain is nonexertional, nonpleuritic.  Described as a pressure in his chest.  Has not seen a cardiologist in many years, states last angiogram he was told he has a "blockage" but unsure what intervention was placed.  Patient appears very fatigued, states it is because he is been working 14-hour shifts as an [employer] .  EKG nonischemic, vital signs stable.  Patient nontoxic-appearing, skin warm non-diaphoretic, chest nontender, heart and lungs normal, abdomen soft nontender, no pitting edema lower extremities.  Differential includes: CHF, valvular disease, anemia, electrolyte abnormality, dehydration, less likely ACS, doubt pneumothorax, aortic pathology, Boerhaave's, esophageal disruption.  Will evaluate with blood work, chest x-ray, if work-up negative will proceed with outpatient follow-up.

## 2023-07-23 NOTE — ED ADULT TRIAGE NOTE - CHIEF COMPLAINT QUOTE
Medication: Trazodone   Dosage: 100 mg   Sig: take 1 tab po at bedtime   Last Refill: 2/25/22  Last Office Visit for this diagnosis or CPE/MWV/PREOP: 6/2/22  (Return in  no follow up on file)  Next Appt:  Not made   Pertinent labs UTD: no      Prescription does not require PDMP check    Sent to Provider to advise on Refill(s)   SIB URGENT CARE FOR CHEST PAIN, BILATERAL FEET SWELLING. POT STATES " I FEEL LIKE SOMETHING HEAVY IS SITTING ON MY CHEST". DENIES SOB/N/V/D/FEVER/CHILLS. NO SIGNIFICANT pmh.

## 2023-07-23 NOTE — ED PROVIDER NOTE - NS_ ATTENDINGSCRIBEDETAILS _ED_A_ED_FT
I, Davin Shane DO,  performed the initial face to face bedside interview with this patient regarding history of present illness, review of symptoms and relevant past medical, social and family history.  I completed an independent physical examination.  I was the initial provider who evaluated this patient.   I personally saw the patient and performed a substantive portion of the visit including all aspects of the medical decision making.  The history, relevant review of systems, past medical and surgical history, medical decision making, and physical examination was documented by the scribe in my presence and I attest to the accuracy of the documentation.

## 2023-07-23 NOTE — ED ADULT NURSE NOTE - CHIEF COMPLAINT QUOTE
SIB URGENT CARE FOR CHEST PAIN, BILATERAL FEET SWELLING. POT STATES " I FEEL LIKE SOMETHING HEAVY IS SITTING ON MY CHEST". DENIES SOB/N/V/D/FEVER/CHILLS. NO SIGNIFICANT pmh.

## 2023-07-23 NOTE — ED PROVIDER NOTE - PROGRESS NOTE DETAILS
I personally reviewed patient's chest x-ray without any acute findings.  See wet read. All lab results personally reviewed by myself, they are nonactionable.  Troponin normal, BNP normal.  Chest x-ray personally reviewed with no acute findings.  See wet read for additional details.  Discussed results with patient, he states his chest pain has resolved at this point.  He has a cardiologist that he can follow-up with outpatient.

## 2023-07-24 VITALS
HEART RATE: 76 BPM | SYSTOLIC BLOOD PRESSURE: 129 MMHG | DIASTOLIC BLOOD PRESSURE: 79 MMHG | OXYGEN SATURATION: 99 % | RESPIRATION RATE: 19 BRPM

## 2023-07-26 ENCOUNTER — APPOINTMENT (OUTPATIENT)
Dept: NEUROLOGY | Facility: CLINIC | Age: 63
End: 2023-07-26

## 2023-08-18 ENCOUNTER — APPOINTMENT (OUTPATIENT)
Dept: NEUROLOGY | Facility: CLINIC | Age: 63
End: 2023-08-18

## 2023-11-02 ENCOUNTER — EMERGENCY (EMERGENCY)
Facility: HOSPITAL | Age: 63
LOS: 1 days | Discharge: HOME | End: 2023-11-02
Attending: EMERGENCY MEDICINE
Payer: COMMERCIAL

## 2023-11-02 VITALS
RESPIRATION RATE: 18 BRPM | DIASTOLIC BLOOD PRESSURE: 88 MMHG | HEART RATE: 87 BPM | SYSTOLIC BLOOD PRESSURE: 131 MMHG | OXYGEN SATURATION: 97 % | TEMPERATURE: 98 F

## 2023-11-02 VITALS
DIASTOLIC BLOOD PRESSURE: 52 MMHG | RESPIRATION RATE: 20 BRPM | HEART RATE: 121 BPM | WEIGHT: 194.01 LBS | OXYGEN SATURATION: 99 % | HEIGHT: 71 IN | SYSTOLIC BLOOD PRESSURE: 70 MMHG

## 2023-11-02 DIAGNOSIS — R55 SYNCOPE AND COLLAPSE: ICD-10-CM

## 2023-11-02 DIAGNOSIS — Z98.89 OTHER SPECIFIED POSTPROCEDURAL STATES: Chronic | ICD-10-CM

## 2023-11-02 DIAGNOSIS — S02.609A FRACTURE OF MANDIBLE, UNSPECIFIED, INITIAL ENCOUNTER FOR CLOSED FRACTURE: Chronic | ICD-10-CM

## 2023-11-02 LAB
ALBUMIN SERPL ELPH-MCNC: 4.6 G/DL — SIGNIFICANT CHANGE UP (ref 3.3–5.2)
ALBUMIN SERPL ELPH-MCNC: 4.6 G/DL — SIGNIFICANT CHANGE UP (ref 3.3–5.2)
ALP SERPL-CCNC: 87 U/L — SIGNIFICANT CHANGE UP (ref 40–120)
ALP SERPL-CCNC: 87 U/L — SIGNIFICANT CHANGE UP (ref 40–120)
ALT FLD-CCNC: 28 U/L — SIGNIFICANT CHANGE UP
ALT FLD-CCNC: 28 U/L — SIGNIFICANT CHANGE UP
ANION GAP SERPL CALC-SCNC: 13 MMOL/L — SIGNIFICANT CHANGE UP (ref 5–17)
ANION GAP SERPL CALC-SCNC: 13 MMOL/L — SIGNIFICANT CHANGE UP (ref 5–17)
ANISOCYTOSIS BLD QL: SLIGHT — SIGNIFICANT CHANGE UP
ANISOCYTOSIS BLD QL: SLIGHT — SIGNIFICANT CHANGE UP
APPEARANCE UR: CLEAR — SIGNIFICANT CHANGE UP
APPEARANCE UR: CLEAR — SIGNIFICANT CHANGE UP
APTT BLD: 27.1 SEC — SIGNIFICANT CHANGE UP (ref 24.5–35.6)
APTT BLD: 27.1 SEC — SIGNIFICANT CHANGE UP (ref 24.5–35.6)
AST SERPL-CCNC: 23 U/L — SIGNIFICANT CHANGE UP
AST SERPL-CCNC: 23 U/L — SIGNIFICANT CHANGE UP
BACTERIA # UR AUTO: NEGATIVE /HPF — SIGNIFICANT CHANGE UP
BACTERIA # UR AUTO: NEGATIVE /HPF — SIGNIFICANT CHANGE UP
BASOPHILS # BLD AUTO: 0.04 K/UL — SIGNIFICANT CHANGE UP (ref 0–0.2)
BASOPHILS # BLD AUTO: 0.04 K/UL — SIGNIFICANT CHANGE UP (ref 0–0.2)
BASOPHILS NFR BLD AUTO: 1.2 % — SIGNIFICANT CHANGE UP (ref 0–2)
BASOPHILS NFR BLD AUTO: 1.2 % — SIGNIFICANT CHANGE UP (ref 0–2)
BILIRUB SERPL-MCNC: 1.2 MG/DL — SIGNIFICANT CHANGE UP (ref 0.4–2)
BILIRUB SERPL-MCNC: 1.2 MG/DL — SIGNIFICANT CHANGE UP (ref 0.4–2)
BILIRUB UR-MCNC: NEGATIVE — SIGNIFICANT CHANGE UP
BILIRUB UR-MCNC: NEGATIVE — SIGNIFICANT CHANGE UP
BUN SERPL-MCNC: 12.9 MG/DL — SIGNIFICANT CHANGE UP (ref 8–20)
BUN SERPL-MCNC: 12.9 MG/DL — SIGNIFICANT CHANGE UP (ref 8–20)
BURR CELLS BLD QL SMEAR: PRESENT — SIGNIFICANT CHANGE UP
BURR CELLS BLD QL SMEAR: PRESENT — SIGNIFICANT CHANGE UP
CALCIUM SERPL-MCNC: 9.1 MG/DL — SIGNIFICANT CHANGE UP (ref 8.4–10.5)
CALCIUM SERPL-MCNC: 9.1 MG/DL — SIGNIFICANT CHANGE UP (ref 8.4–10.5)
CHLORIDE SERPL-SCNC: 98 MMOL/L — SIGNIFICANT CHANGE UP (ref 96–108)
CHLORIDE SERPL-SCNC: 98 MMOL/L — SIGNIFICANT CHANGE UP (ref 96–108)
CO2 SERPL-SCNC: 26 MMOL/L — SIGNIFICANT CHANGE UP (ref 22–29)
CO2 SERPL-SCNC: 26 MMOL/L — SIGNIFICANT CHANGE UP (ref 22–29)
COLOR SPEC: YELLOW — SIGNIFICANT CHANGE UP
COLOR SPEC: YELLOW — SIGNIFICANT CHANGE UP
CREAT SERPL-MCNC: 1.22 MG/DL — SIGNIFICANT CHANGE UP (ref 0.5–1.3)
CREAT SERPL-MCNC: 1.22 MG/DL — SIGNIFICANT CHANGE UP (ref 0.5–1.3)
DIFF PNL FLD: NEGATIVE — SIGNIFICANT CHANGE UP
DIFF PNL FLD: NEGATIVE — SIGNIFICANT CHANGE UP
EGFR: 66 ML/MIN/1.73M2 — SIGNIFICANT CHANGE UP
EGFR: 66 ML/MIN/1.73M2 — SIGNIFICANT CHANGE UP
ELLIPTOCYTES BLD QL SMEAR: SLIGHT — SIGNIFICANT CHANGE UP
ELLIPTOCYTES BLD QL SMEAR: SLIGHT — SIGNIFICANT CHANGE UP
EOSINOPHIL # BLD AUTO: 0.15 K/UL — SIGNIFICANT CHANGE UP (ref 0–0.5)
EOSINOPHIL # BLD AUTO: 0.15 K/UL — SIGNIFICANT CHANGE UP (ref 0–0.5)
EOSINOPHIL NFR BLD AUTO: 4.6 % — SIGNIFICANT CHANGE UP (ref 0–6)
EOSINOPHIL NFR BLD AUTO: 4.6 % — SIGNIFICANT CHANGE UP (ref 0–6)
EPI CELLS # UR: SIGNIFICANT CHANGE UP
EPI CELLS # UR: SIGNIFICANT CHANGE UP
GLUCOSE SERPL-MCNC: 112 MG/DL — HIGH (ref 70–99)
GLUCOSE SERPL-MCNC: 112 MG/DL — HIGH (ref 70–99)
GLUCOSE UR QL: NEGATIVE MG/DL — SIGNIFICANT CHANGE UP
GLUCOSE UR QL: NEGATIVE MG/DL — SIGNIFICANT CHANGE UP
HCT VFR BLD CALC: 43.5 % — SIGNIFICANT CHANGE UP (ref 39–50)
HCT VFR BLD CALC: 43.5 % — SIGNIFICANT CHANGE UP (ref 39–50)
HGB BLD-MCNC: 14.2 G/DL — SIGNIFICANT CHANGE UP (ref 13–17)
HGB BLD-MCNC: 14.2 G/DL — SIGNIFICANT CHANGE UP (ref 13–17)
IMM GRANULOCYTES NFR BLD AUTO: 0.3 % — SIGNIFICANT CHANGE UP (ref 0–0.9)
IMM GRANULOCYTES NFR BLD AUTO: 0.3 % — SIGNIFICANT CHANGE UP (ref 0–0.9)
INR BLD: 1.1 RATIO — SIGNIFICANT CHANGE UP (ref 0.85–1.18)
INR BLD: 1.1 RATIO — SIGNIFICANT CHANGE UP (ref 0.85–1.18)
KETONES UR-MCNC: NEGATIVE — SIGNIFICANT CHANGE UP
KETONES UR-MCNC: NEGATIVE — SIGNIFICANT CHANGE UP
LEUKOCYTE ESTERASE UR-ACNC: NEGATIVE — SIGNIFICANT CHANGE UP
LEUKOCYTE ESTERASE UR-ACNC: NEGATIVE — SIGNIFICANT CHANGE UP
LYMPHOCYTES # BLD AUTO: 1.29 K/UL — SIGNIFICANT CHANGE UP (ref 1–3.3)
LYMPHOCYTES # BLD AUTO: 1.29 K/UL — SIGNIFICANT CHANGE UP (ref 1–3.3)
LYMPHOCYTES # BLD AUTO: 39.9 % — SIGNIFICANT CHANGE UP (ref 13–44)
LYMPHOCYTES # BLD AUTO: 39.9 % — SIGNIFICANT CHANGE UP (ref 13–44)
MANUAL SMEAR VERIFICATION: SIGNIFICANT CHANGE UP
MANUAL SMEAR VERIFICATION: SIGNIFICANT CHANGE UP
MCHC RBC-ENTMCNC: 23.7 PG — LOW (ref 27–34)
MCHC RBC-ENTMCNC: 23.7 PG — LOW (ref 27–34)
MCHC RBC-ENTMCNC: 32.6 GM/DL — SIGNIFICANT CHANGE UP (ref 32–36)
MCHC RBC-ENTMCNC: 32.6 GM/DL — SIGNIFICANT CHANGE UP (ref 32–36)
MCV RBC AUTO: 72.5 FL — LOW (ref 80–100)
MCV RBC AUTO: 72.5 FL — LOW (ref 80–100)
MICROCYTES BLD QL: SLIGHT — SIGNIFICANT CHANGE UP
MICROCYTES BLD QL: SLIGHT — SIGNIFICANT CHANGE UP
MONOCYTES # BLD AUTO: 0.33 K/UL — SIGNIFICANT CHANGE UP (ref 0–0.9)
MONOCYTES # BLD AUTO: 0.33 K/UL — SIGNIFICANT CHANGE UP (ref 0–0.9)
MONOCYTES NFR BLD AUTO: 10.2 % — SIGNIFICANT CHANGE UP (ref 2–14)
MONOCYTES NFR BLD AUTO: 10.2 % — SIGNIFICANT CHANGE UP (ref 2–14)
NEUTROPHILS # BLD AUTO: 1.41 K/UL — LOW (ref 1.8–7.4)
NEUTROPHILS # BLD AUTO: 1.41 K/UL — LOW (ref 1.8–7.4)
NEUTROPHILS NFR BLD AUTO: 43.8 % — SIGNIFICANT CHANGE UP (ref 43–77)
NEUTROPHILS NFR BLD AUTO: 43.8 % — SIGNIFICANT CHANGE UP (ref 43–77)
NITRITE UR-MCNC: NEGATIVE — SIGNIFICANT CHANGE UP
NITRITE UR-MCNC: NEGATIVE — SIGNIFICANT CHANGE UP
OVALOCYTES BLD QL SMEAR: SLIGHT — SIGNIFICANT CHANGE UP
OVALOCYTES BLD QL SMEAR: SLIGHT — SIGNIFICANT CHANGE UP
PH UR: 7 — SIGNIFICANT CHANGE UP (ref 5–8)
PH UR: 7 — SIGNIFICANT CHANGE UP (ref 5–8)
PLAT MORPH BLD: NORMAL — SIGNIFICANT CHANGE UP
PLAT MORPH BLD: NORMAL — SIGNIFICANT CHANGE UP
PLATELET # BLD AUTO: 177 K/UL — SIGNIFICANT CHANGE UP (ref 150–400)
PLATELET # BLD AUTO: 177 K/UL — SIGNIFICANT CHANGE UP (ref 150–400)
POIKILOCYTOSIS BLD QL AUTO: SLIGHT — SIGNIFICANT CHANGE UP
POIKILOCYTOSIS BLD QL AUTO: SLIGHT — SIGNIFICANT CHANGE UP
POLYCHROMASIA BLD QL SMEAR: SLIGHT — SIGNIFICANT CHANGE UP
POLYCHROMASIA BLD QL SMEAR: SLIGHT — SIGNIFICANT CHANGE UP
POTASSIUM SERPL-MCNC: 3.6 MMOL/L — SIGNIFICANT CHANGE UP (ref 3.5–5.3)
POTASSIUM SERPL-MCNC: 3.6 MMOL/L — SIGNIFICANT CHANGE UP (ref 3.5–5.3)
POTASSIUM SERPL-SCNC: 3.6 MMOL/L — SIGNIFICANT CHANGE UP (ref 3.5–5.3)
POTASSIUM SERPL-SCNC: 3.6 MMOL/L — SIGNIFICANT CHANGE UP (ref 3.5–5.3)
PROT SERPL-MCNC: 7.5 G/DL — SIGNIFICANT CHANGE UP (ref 6.6–8.7)
PROT SERPL-MCNC: 7.5 G/DL — SIGNIFICANT CHANGE UP (ref 6.6–8.7)
PROT UR-MCNC: 30 MG/DL
PROT UR-MCNC: 30 MG/DL
PROTHROM AB SERPL-ACNC: 12.2 SEC — SIGNIFICANT CHANGE UP (ref 9.5–13)
PROTHROM AB SERPL-ACNC: 12.2 SEC — SIGNIFICANT CHANGE UP (ref 9.5–13)
RAPID RVP RESULT: SIGNIFICANT CHANGE UP
RAPID RVP RESULT: SIGNIFICANT CHANGE UP
RBC # BLD: 6 M/UL — HIGH (ref 4.2–5.8)
RBC # BLD: 6 M/UL — HIGH (ref 4.2–5.8)
RBC # FLD: 17.7 % — HIGH (ref 10.3–14.5)
RBC # FLD: 17.7 % — HIGH (ref 10.3–14.5)
RBC BLD AUTO: ABNORMAL
RBC BLD AUTO: ABNORMAL
RBC CASTS # UR COMP ASSIST: NEGATIVE /HPF — SIGNIFICANT CHANGE UP (ref 0–4)
RBC CASTS # UR COMP ASSIST: NEGATIVE /HPF — SIGNIFICANT CHANGE UP (ref 0–4)
SARS-COV-2 RNA SPEC QL NAA+PROBE: SIGNIFICANT CHANGE UP
SARS-COV-2 RNA SPEC QL NAA+PROBE: SIGNIFICANT CHANGE UP
SODIUM SERPL-SCNC: 137 MMOL/L — SIGNIFICANT CHANGE UP (ref 135–145)
SODIUM SERPL-SCNC: 137 MMOL/L — SIGNIFICANT CHANGE UP (ref 135–145)
SP GR SPEC: 1 — LOW (ref 1.01–1.02)
SP GR SPEC: 1 — LOW (ref 1.01–1.02)
TROPONIN T SERPL-MCNC: <0.01 NG/ML — SIGNIFICANT CHANGE UP (ref 0–0.06)
UROBILINOGEN FLD QL: NEGATIVE MG/DL — SIGNIFICANT CHANGE UP
UROBILINOGEN FLD QL: NEGATIVE MG/DL — SIGNIFICANT CHANGE UP
WBC # BLD: 3.23 K/UL — LOW (ref 3.8–10.5)
WBC # BLD: 3.23 K/UL — LOW (ref 3.8–10.5)
WBC # FLD AUTO: 3.23 K/UL — LOW (ref 3.8–10.5)
WBC # FLD AUTO: 3.23 K/UL — LOW (ref 3.8–10.5)
WBC UR QL: NEGATIVE /HPF — SIGNIFICANT CHANGE UP (ref 0–5)
WBC UR QL: NEGATIVE /HPF — SIGNIFICANT CHANGE UP (ref 0–5)

## 2023-11-02 PROCEDURE — 93306 TTE W/DOPPLER COMPLETE: CPT

## 2023-11-02 PROCEDURE — 84484 ASSAY OF TROPONIN QUANT: CPT

## 2023-11-02 PROCEDURE — 85025 COMPLETE CBC W/AUTO DIFF WBC: CPT

## 2023-11-02 PROCEDURE — 99285 EMERGENCY DEPT VISIT HI MDM: CPT | Mod: 25

## 2023-11-02 PROCEDURE — 96360 HYDRATION IV INFUSION INIT: CPT | Mod: XU

## 2023-11-02 PROCEDURE — 81001 URINALYSIS AUTO W/SCOPE: CPT

## 2023-11-02 PROCEDURE — 93306 TTE W/DOPPLER COMPLETE: CPT | Mod: 26

## 2023-11-02 PROCEDURE — 70450 CT HEAD/BRAIN W/O DYE: CPT | Mod: 26,MA

## 2023-11-02 PROCEDURE — 83880 ASSAY OF NATRIURETIC PEPTIDE: CPT

## 2023-11-02 PROCEDURE — G0378: CPT

## 2023-11-02 PROCEDURE — 85730 THROMBOPLASTIN TIME PARTIAL: CPT

## 2023-11-02 PROCEDURE — 96361 HYDRATE IV INFUSION ADD-ON: CPT

## 2023-11-02 PROCEDURE — 85610 PROTHROMBIN TIME: CPT

## 2023-11-02 PROCEDURE — 71275 CT ANGIOGRAPHY CHEST: CPT | Mod: MA

## 2023-11-02 PROCEDURE — 0225U NFCT DS DNA&RNA 21 SARSCOV2: CPT

## 2023-11-02 PROCEDURE — 82962 GLUCOSE BLOOD TEST: CPT

## 2023-11-02 PROCEDURE — 71045 X-RAY EXAM CHEST 1 VIEW: CPT | Mod: 26

## 2023-11-02 PROCEDURE — 93005 ELECTROCARDIOGRAM TRACING: CPT

## 2023-11-02 PROCEDURE — 70450 CT HEAD/BRAIN W/O DYE: CPT | Mod: MA

## 2023-11-02 PROCEDURE — 71275 CT ANGIOGRAPHY CHEST: CPT | Mod: 26,MA

## 2023-11-02 PROCEDURE — 71045 X-RAY EXAM CHEST 1 VIEW: CPT

## 2023-11-02 PROCEDURE — 87040 BLOOD CULTURE FOR BACTERIA: CPT

## 2023-11-02 PROCEDURE — 36415 COLL VENOUS BLD VENIPUNCTURE: CPT

## 2023-11-02 PROCEDURE — 93010 ELECTROCARDIOGRAM REPORT: CPT

## 2023-11-02 PROCEDURE — 80053 COMPREHEN METABOLIC PANEL: CPT

## 2023-11-02 PROCEDURE — 99291 CRITICAL CARE FIRST HOUR: CPT

## 2023-11-02 PROCEDURE — 99285 EMERGENCY DEPT VISIT HI MDM: CPT

## 2023-11-02 RX ORDER — ATORVASTATIN CALCIUM 80 MG/1
40 TABLET, FILM COATED ORAL AT BEDTIME
Refills: 0 | Status: DISCONTINUED | OUTPATIENT
Start: 2023-11-02 | End: 2023-11-09

## 2023-11-02 RX ORDER — METOPROLOL TARTRATE 50 MG
25 TABLET ORAL
Refills: 0 | Status: DISCONTINUED | OUTPATIENT
Start: 2023-11-02 | End: 2023-11-09

## 2023-11-02 RX ORDER — ASPIRIN/CALCIUM CARB/MAGNESIUM 324 MG
81 TABLET ORAL DAILY
Refills: 0 | Status: DISCONTINUED | OUTPATIENT
Start: 2023-11-02 | End: 2023-11-09

## 2023-11-02 RX ORDER — TAMSULOSIN HYDROCHLORIDE 0.4 MG/1
0.4 CAPSULE ORAL AT BEDTIME
Refills: 0 | Status: DISCONTINUED | OUTPATIENT
Start: 2023-11-02 | End: 2023-11-09

## 2023-11-02 RX ORDER — SODIUM CHLORIDE 9 MG/ML
2700 INJECTION, SOLUTION INTRAVENOUS ONCE
Refills: 0 | Status: COMPLETED | OUTPATIENT
Start: 2023-11-02 | End: 2023-11-02

## 2023-11-02 RX ORDER — TRIAMTERENE/HYDROCHLOROTHIAZID 75 MG-50MG
1 TABLET ORAL DAILY
Refills: 0 | Status: DISCONTINUED | OUTPATIENT
Start: 2023-11-02 | End: 2023-11-09

## 2023-11-02 RX ORDER — TRAZODONE HCL 50 MG
50 TABLET ORAL AT BEDTIME
Refills: 0 | Status: DISCONTINUED | OUTPATIENT
Start: 2023-11-02 | End: 2023-11-09

## 2023-11-02 RX ADMIN — SODIUM CHLORIDE 2700 MILLILITER(S): 9 INJECTION, SOLUTION INTRAVENOUS at 14:00

## 2023-11-02 RX ADMIN — SODIUM CHLORIDE 2700 MILLILITER(S): 9 INJECTION, SOLUTION INTRAVENOUS at 10:49

## 2023-11-02 NOTE — ED CDU PROVIDER DISPOSITION NOTE - CARE PROVIDER_API CALL
Igor Burnette  Cardiovascular Disease  [address], [place] [address]  Phone: [phone]  Fax: [phone]  Follow Up Time:
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
## 2023-11-02 NOTE — ED PROVIDER NOTE - OBJECTIVE STATEMENT
64 y/o M pt w/ PMHx of HTN, PE presenting w/ generalized weakness today. He was riding his bike to the train when he began to feel like he was going to pass out. He made it onto the train, got off and came to the hospital. He denies chest pain, SOB, abd pain, N/V/D, back pain, fever, chills, numbness, tingling, focal weakness, headache, or any other complaints. 64 y/o M pt w/ PMHx of HTN, PE presenting w/ generalized weakness today. He was riding his bike to the train when he began to feel like he was going to pass out. He made it onto the train, got off and came to the hospital. He denies chest pain, SOB, abd pain, N/V/D, back pain, fever, chills, numbness, tingling, focal weakness, headache, or any other complaints. of note he took tamsulosin this morning, also took triamterene-HCTZ for the first time this morning. reports he ate breakfast.

## 2023-11-02 NOTE — ED CDU PROVIDER INITIAL DAY NOTE - NS ED ROS FT
No fever/chills, No photophobia/eye pain/changes in vision, No ear pain/sore throat/dysphagia, No chest pain/palpitations, no SOB/cough/wheeze/stridor, No abdominal pain, No N/V/D, no dysuria/frequency/discharge, No neck/back pain, no rash, +weak, +near syncope.

## 2023-11-02 NOTE — ED ADULT NURSE NOTE - ED STAT RN HANDOFF DETAILS
pt verbalizes understanding & denies any questions regarding d/c instructions & follow-up. Will return to ED for any new or worsening symptoms.

## 2023-11-02 NOTE — ED CDU PROVIDER DISPOSITION NOTE - ATTENDING CONTRIBUTION TO CARE
I, Ruiz Feliz, participated in the care of this patient with the ACP. I discussed the history and physical exam findings as well as lab results and plan of care with the ACP. I agree with ACP's history, physical and assessment.
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
## 2023-11-02 NOTE — CONSULT NOTE ADULT - NS ATTEND AMEND GEN_ALL_CORE FT
-    63M hx HTN, PE, SVT, hx of substance misuse (no longer uses) who presents to [facility] ED with generalized weakness and feeling unwell. Patient took his AM meds (newly prescribed  triamterene-HCTZ and tamsulosin) and  rode his bike to the train station to meet his  in [place].   He felt weak, unwell and came to the ED for further evaluation. In ED CTA negative for PE, CT head without significant abnormality. He was hypotensive. He was given fluid with improvement in BP.    Near syncope.:  EKG NSR.   TTE without significant abnormality, preserved EF. Orthostatics negative  hypotensive on arrival, given fluid with improvement in symptoms  near syncope likely due to polypharmacy from beginning new medications and taking both  triamterene-HCTZ and tamsulosin at the same time  patient advised to separate meds and take tamsulosin at HS, triamterene-HCTZ in AM  Cardiology FU OP for further workup within next 2- 4 weeks  No further inpatient cardiac work up at this time.  Will sign off.

## 2023-11-02 NOTE — ED CDU PROVIDER DISPOSITION NOTE - PATIENT PORTAL LINK FT
You can access the FollowMyHealth Patient Portal offered by [facility] by registering at the following website: [email or web address]. By joining [employer]’s FollowMyHealth portal, you will also be able to view your health information using other applications (apps) compatible with our system.

## 2023-11-02 NOTE — ED ADULT TRIAGE NOTE - CHIEF COMPLAINT QUOTE
Pt A&Ox4, NAD. Pt presents to the ED with complaints of lightheadedness and generalized body weakness. Denies CP at this time. Breathing even and unlabored. Pt hypotensive in triage. Sent to critical.

## 2023-11-02 NOTE — CONSULT NOTE ADULT - SUBJECTIVE AND OBJECTIVE BOX
[facility]                                              CARDIOLOGY AT [facility]                                                   [address], [place] [address]                                             Telephone: [phone]. Fax:[phone]                                                       CARDIOLOGY CONSULTATION NOTE                                                                                             History obtained by: Patient and medical record  Community Cardiologist: none   obtained: Yes [  ] No [ x ]  Reason for Consultation: near syncope  Available out pt records reviewed: Yes [  ] No [  ]    Chief complaint:    Patient is a 63y old  Male who presents with a chief complaint of     HPI:  62 y/o M with PMHx HTN, PE, SVT, hx of substance misuse (no longer uses) who presents to [facility] ED with generalized weakness and feeling unwell. Patient took his AM meds (newly prescribed  triamterene-HCTZ and tamsulosin) and  rode his bike to the train station to meet his  in [place]. As he was carrying his bike up the platform he began to feel very weak. On the train he felt better but when he started to move again he felt unwell and came to the ED for further evaluation. In ED CTA negative for PE, CT head without significant abnormality. He was given fluid with improvement in BP. Denies chest pain, back pain, headache, SOB, PRIETO, diaphoresis, or N/V.        CARDIAC TESTING   ECHO:< from: TTE Echo Complete w/o Contrast w/ Doppler (23 @ 15:10) >  PHYSICIAN INTERPRETATION:  Left Ventricle: The left ventricular internal cavity size is normal. Left   ventricular wall thickness is normal.  Global LV systolic function was normal. Left ventricular ejection   fraction, by visual estimation, is 55 to 60%. Spectral Doppler shows   impaired relaxation pattern of left ventricular myocardial filling (Grade   I diastolic dysfunction).  Right Ventricle: The right ventricular size is normal. RV systolic   function is normal.  Left Atrium: Mildly enlarged left atrium. Mobile intra-atrial septum.  Right Atrium: Normal right atrial size.  Pericardium: There is no evidence of pericardial effusion.  Mitral Valve: Mild thickening of the anterior and posterior mitral valve   leaflets. Trace mitral valve regurgitation is seen.  Tricuspid Valve: The tricuspid valve is normal in structure. Trivial   tricuspid regurgitation is visualized.  Aortic Valve: The aortic valve is trileaflet. No evidence of aortic valve   regurgitation is seen.  Pulmonic Valve: The pulmonic valve is normal. Trace pulmonic valve   regurgitation.  Aorta: The aortic root is normal in size and structure.  Pulmonary Artery: The pulmonary artery is mildly dilated.  Venous: The inferior vena cava was normal sized, with respiratory size   variation greater than 50%.      Summary:   1. Left ventricular ejection fraction, by visual estimation, is 55 to   60%.   2. Normal global left ventricular systolic function.   3. Spectral Doppler shows impaired relaxation pattern of left  ventricular myocardial filling (Grade I diastolic dysfunction).   4. There is no evidence of pericardial effusion.   5. Mild thickening of the anterior and posterior mitral valve leaflets.   6. Trace mitral valve regurgitation.   7. Mildly dilated pulmonary artery.    MD Ralph Electronically signed on 2023 at 5:20:12 PM    < end of copied text >      STRESS:    CATH:     ELECTROPHYSIOLOGY:     PAST MEDICAL HISTORY  Thalassanemia, minor  Post traumatic stress disorder  Enlarged prostate  PE (pulmonary thromboembolism)  Insomnia  Cocaine abuse      PAST SURGICAL HISTORY  H/O left knee surgery  Jaw fracture      SOCIAL HISTORY:  Denies smoking/alcohol/drugs  CIGARETTES:     ALCOHOL:  DRUGS:    FAMILY HISTORY:  Family history of thalassemia (Mother)  Family history of MI (myocardial infarction) (Father)  Family history of CHF (congestive heart failure) (Father)    Family History of Cardiovascular Disease:  Yes [ x ] No [  ]  Coronary Artery Disease in first degree relative: Yes [ x ] No [  ]  Sudden Cardiac Death in First degree relative: Yes [  ] No [ x ]    HOME MEDICATIONS:  Flomax 0.4 mg oral capsule: cap(s) orally once a day (2023 16:50)  folic acid 1 mg oral tablet: 1 tab(s) orally once a day (2023 16:50)  RisperDAL 1 mg oral tablet: 1 tab(s) orally once a day (at bedtime) (2023 16:50)  Vistaril 25 mg oral capsule: 1 cap(s) orally once a day (at bedtime), As Needed (2023 16:50)      CURRENT CARDIAC MEDICATIONS:  metoprolol tartrate 25 milliGRAM(s) Oral two times a day  triamterene 37.5 mG/hydrochlorothiazide 25 mG Tablet 1 Tablet(s) Oral daily      CURRENT OTHER MEDICATIONS:  traZODone 50 milliGRAM(s) Oral at bedtime  aspirin  chewable 81 milliGRAM(s) Oral daily  atorvastatin 40 milliGRAM(s) Oral at bedtime  tamsulosin 0.4 milliGRAM(s) Oral at bedtime      ALLERGIES:   No Known Drug Allergies  Nuts (Hives)  peanuts (Anaphylaxis)      REVIEW OF SYMPTOMS:   CONSTITUTIONAL: No fever, no chills, no weight loss, no weight gain, +fatigue, +weakness   ENMT:  No vertigo; No sinus or throat pain  NECK: No pain or stiffness  CARDIOVASCULAR: No chest pain, no dyspnea, no syncope/presyncope, no palpitations, no dizziness, no Orthopnea, no Paroxsymal nocturnal dyspnea  RESPIRATORY: no Shortness of breath, no cough, no wheezing  : No dysuria, no hematuria   GI: No dark color stool, no nausea, no diarrhea, no constipation, no abdominal pain   NEURO: No headache, no slurred speech   MUSCULOSKELETAL: No joint pain or swelling; No muscle, back, or extremity pain  PSYCH: No agitation, no anxiety.    ALL OTHER REVIEW OF SYSTEMS ARE NEGATIVE.    VITAL SIGNS:  T(C): 37.1 (23 @ 10:43), Max: 37.1 (23 @ 10:43)  T(F): 98.7 (23 @ 10:43), Max: 98.7 (23 @ 10:43)  HR: 78 (23 @ 16:20) (76 - 124)  BP: 120/84 (23 @ 16:20) (70/52 - 131/82)  RR: 20 (23 @ 16:20) (18 - 20)  SpO2: 100% (23 @ 16:20) (97% - 100%)    INTAKE AND OUTPUT:       PHYSICAL EXAM:  Constitutional: Comfortable . No acute distress.   HEENT: Atraumatic and normocephalic , neck is supple . no JVD. No carotid bruit.  CNS: A&Ox3. No focal deficits.   Respiratory: CTAB, unlabored   Cardiovascular: RRR normal s1 s2. No murmur. No rubs or gallop.  Gastrointestinal: Soft, non-tender. +Bowel sounds.   Extremities: 2+ Peripheral Pulses, No clubbing, cyanosis, or edema  Psychiatric: Calm . no agitation.   Skin: Warm and dry, no ulcers on extremities     LABS:  ( 2023 14:45 )  Troponin T  <0.01,  CPK  X    , CKMB  X    , BNP X        , ( 2023 10:36 )  Troponin T  <0.01,  CPK  X    , CKMB  X    , BNP X                                  14.2   3.23  )-----------( 177      ( 2023 10:36 )             43.5     11    137  |  98  |  12.9  ----------------------------<  112<H>  3.6   |  26.0  |  1.22    Ca    9.1      2023 10:36    TPro  7.5  /  Alb  4.6  /  TBili  1.2  /  DBili  x   /  AST  23  /  ALT  28  /  AlkPhos  87  11    PT/INR - ( 2023 10:36 )   PT: 12.2 sec;   INR: 1.10 ratio         PTT - ( 2023 10:36 )  PTT:27.1 sec  Urinalysis Basic - ( 2023 11:53 )    Color: Yellow / Appearance: Clear / S.005 / pH: x  Gluc: x / Ketone: Negative  / Bili: Negative / Urobili: Negative mg/dL   Blood: x / Protein: 30 mg/dL / Nitrite: Negative   Leuk Esterase: Negative / RBC: Negative /HPF / WBC Negative /HPF   Sq Epi: x / Non Sq Epi: x / Bacteria: Negative /HPF              INTERPRETATION OF TELEMETRY:     ECG: NSR nonspecific TW abnormality  Prior ECG: Yes [  ] No [  ]    RADIOLOGY & ADDITIONAL STUDIES:    X-ray:    CT scan:   < from: CT Angio Chest PE Protocol w/ IV Cont (23 @ 12:15) >  IMPRESSION:    No pulmonary embolism.    --- End of Report ---    < end of copied text >  < from: CT Head No Cont (23 @ 12:13) >  MPRESSION:  Mild chronic microvascular changes without evidence of an   acute transcortical infarction or hemorrhage.    --- End of Report ---    < end of copied text >    MRI:   US:

## 2023-11-02 NOTE — ED ADULT NURSE NOTE - NSFALLUNIVINTERV_ED_ALL_ED
Bed/Stretcher in lowest position, wheels locked, appropriate side rails in place/Call bell, personal items and telephone in reach/Instruct patient to call for assistance before getting out of bed/chair/stretcher/Non-slip footwear applied when patient is off stretcher/[place] to call system/Physically safe environment - no spills, clutter or unnecessary equipment/Purposeful proactive rounding/Room/bathroom lighting operational, light cord in reach
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
## 2023-11-02 NOTE — ED ADULT TRIAGE NOTE - HEIGHT IN CM
TSH ok  Thyroid antibodies just a smidge elevated so not clinically relevant  Need a close eye on tsh   Repeat in 3 months 180.34

## 2023-11-02 NOTE — CONSULT NOTE ADULT - PROBLEM SELECTOR RECOMMENDATION 9
.   - EKG NSR  - TTE without significant abnormality, preserved EF  - Orthostatics negative  - hypotensive on arrival, given fluid with improvement in symptoms  - near syncope likely due to polypharmacy from beginning new medications and taking both  triamterene-HCTZ and tamsulosin at the same time  - patient advised to separate meds and take tamsulosin at HS, triamterene-HCTZ in AM  - advised to follow up with Dr. Burnette as OP for further workup within next 4 weeks    No further inpatient cardiac work up at this time.  Will sign off.  Please reconsult if needed.

## 2023-11-02 NOTE — CONSULT NOTE ADULT - ASSESSMENT
64 y/o M with PMHx HTN, PE, SVT, hx of substance misuse (no longer uses) who presents to [facility] ED with generalized weakness and feeling unwell. Patient took his AM meds (newly prescribed  triamterene-HCTZ and tamsulosin) and  rode his bike to the train station to meet his  in [place]. As he was carrying his bike up the platform he began to feel very weak. On the train he felt better but when he started to move again he felt unwell and came to the ED for further evaluation. In ED CTA negative for PE, CT head without significant abnormality. He was given fluid with improvement in BP. Denies chest pain, back pain, headache, SOB, PRIETO, diaphoresis, or N/V.

## 2023-11-02 NOTE — ED ADULT NURSE REASSESSMENT NOTE - NS ED NURSE REASSESS COMMENT FT1
Assumed care of pt from SUNDAR Villalobos. Pt in imaging.
Pt laying in bed, resting/sleeping. Pt A&O x4 in NAD @ this time. Respirations even & unlabored. Pt NSR on cardiac monitor and normotensive. LR infusing. Pt denies any pain or complaints @ this time. Bed locked, low, & call bell w/in reach.
Pt returned to ED from Echo. Pt A&Ox4 in NAD @ this time. RR even & unlabored. Pt NSR on cardiac monitor. Pt denies any chest pain, weakness, dizziness/lightheadedness @ this time. Pt given crackers & drink. Bed locked, low, & call bell w/ in reach.

## 2023-11-02 NOTE — ED CDU PROVIDER INITIAL DAY NOTE - ATTENDING APP SHARED VISIT CONTRIBUTION OF CARE
I agree with the PA's note and was available for any issues/concerns. I was directly involved in patient care. My brief overall assessment is as follows:    63 year old male PMHx PE, cocaine abuse c/o pre-syncope; initial work up reviewed; admit to obs for further work up

## 2023-11-02 NOTE — ED CDU PROVIDER DISPOSITION NOTE - NSFOLLOWUPINSTRUCTIONS_ED_ALL_ED_FT
Please follow up with cardiology as an outpatient    Syncope    Syncope is when you temporarily lose consciousness, also called fainting or passing out. It is caused by a sudden decrease in blood flow to the brain. Even though most causes of syncope are not dangerous, syncope can possibly be a sign of a serious medical problem. Signs that you may be about to faint include feeling dizzy, lightheaded, nausea, visual changes, or cold/clammy skin. Do not drive, operate heavy machinery, or play sports until your health care provider says it is okay.    SEEK IMMEDIATE MEDICAL CARE IF YOU HAVE ANY OF THE FOLLOWING SYMPTOMS: severe headache, pain in your chest/abdomen/back, bleeding from your mouth or rectum, palpitations, shortness of breath, pain with breathing, seizure, confusion, or trouble walking.

## 2023-11-02 NOTE — CHART NOTE - NSCHARTNOTEFT_GEN_A_CORE
SW Note: Worker alerted by PA (sunita) that pt is seeking mental health resources for outpatient f/u. Worker met w/ pt- introducing self and role as it pertains to [facility] ED. Pt was pleasant upon contact- amenable to resources. Pt reports that he is linked to [employer]- searching for specialty providers for adults with ADHD. Pt encouraged to contact insurance customer service line. Pt in agreement. No other SW needs.

## 2023-11-02 NOTE — ED PROVIDER NOTE - PHYSICAL EXAMINATION
Gen: NAD  Head: NC/AT  Neck: trachea midline  Card: tachy  Resp:  CTAB  Abd: soft, non-distended, non-tender  Ext: no deformities above reported baseline  Neuro:  A&O, no motor or sensory deficits above reported baseline  Skin:  Warm and dry as visualized  Psych:  Normal affect and mood

## 2023-11-02 NOTE — ED PROVIDER NOTE - CLINICAL SUMMARY MEDICAL DECISION MAKING FREE TEXT BOX
Alert team at bedside.    pt w/ PMHx of HTN and PE presenting w/ generalized weakness. Labs, cultures, ekg, CXR, normal saline ordered. Will obtain rectal temp. pt w/ PMHx of HTN and PE presenting w/ generalized weakness. Labs, cultures, ekg, CXR, normal saline ordered. Will obtain rectal temp. Workup including trop, CTA chest and CT head. Pt placed in observation under syncope protocol for monitoring, serial trops and echo.
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
## 2023-11-07 LAB
CULTURE RESULTS: SIGNIFICANT CHANGE UP
SPECIMEN SOURCE: SIGNIFICANT CHANGE UP

## 2023-11-17 ENCOUNTER — APPOINTMENT (OUTPATIENT)
Dept: PULMONOLOGY | Facility: CLINIC | Age: 63
End: 2023-11-17

## 2023-12-11 ENCOUNTER — APPOINTMENT (OUTPATIENT)
Dept: CARDIOLOGY | Facility: CLINIC | Age: 63
End: 2023-12-11
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
## 2023-12-15 ENCOUNTER — APPOINTMENT (OUTPATIENT)
Dept: PULMONOLOGY | Facility: CLINIC | Age: 63
End: 2023-12-15

## 2024-01-03 ENCOUNTER — NON-APPOINTMENT (OUTPATIENT)
Age: 64
End: 2024-01-03

## 2024-01-03 ENCOUNTER — APPOINTMENT (OUTPATIENT)
Dept: CARDIOLOGY | Facility: CLINIC | Age: 64
End: 2024-01-03
Payer: MEDICARE

## 2024-01-03 VITALS
SYSTOLIC BLOOD PRESSURE: 136 MMHG | BODY MASS INDEX: 26.32 KG/M2 | TEMPERATURE: 97.9 F | WEIGHT: 188 LBS | HEIGHT: 71 IN | HEART RATE: 90 BPM | DIASTOLIC BLOOD PRESSURE: 78 MMHG | OXYGEN SATURATION: 98 %

## 2024-01-03 DIAGNOSIS — I10 ESSENTIAL (PRIMARY) HYPERTENSION: ICD-10-CM

## 2024-01-03 DIAGNOSIS — Z09 ENCOUNTER FOR FOLLOW-UP EXAMINATION AFTER COMPLETED TREATMENT FOR CONDITIONS OTHER THAN MALIGNANT NEOPLASM: ICD-10-CM

## 2024-01-03 PROCEDURE — 99215 OFFICE O/P EST HI 40 MIN: CPT | Mod: 25

## 2024-01-03 PROCEDURE — 93000 ELECTROCARDIOGRAM COMPLETE: CPT

## 2024-01-03 RX ORDER — TRIAMTERENE AND HYDROCHLOROTHIAZIDE 37.5; 25 MG/1; MG/1
37.5-25 CAPSULE ORAL
Qty: 180 | Refills: 3 | Status: ACTIVE | COMMUNITY

## 2024-01-03 RX ORDER — FOLIC ACID 1 MG/1
1 TABLET ORAL
Qty: 30 | Refills: 0 | Status: DISCONTINUED | COMMUNITY
Start: 2017-03-03 | End: 2024-01-03

## 2024-01-03 RX ORDER — EPINEPHRINE 0.3 MG/.3ML
0.3 INJECTION INTRAMUSCULAR
Refills: 0 | Status: ACTIVE | COMMUNITY
Start: 2018-06-07

## 2024-02-06 ENCOUNTER — APPOINTMENT (OUTPATIENT)
Dept: PULMONOLOGY | Facility: CLINIC | Age: 64
End: 2024-02-06
Payer: MEDICARE

## 2024-02-06 VITALS
DIASTOLIC BLOOD PRESSURE: 76 MMHG | OXYGEN SATURATION: 97 % | HEART RATE: 95 BPM | SYSTOLIC BLOOD PRESSURE: 132 MMHG | RESPIRATION RATE: 16 BRPM

## 2024-02-06 VITALS — WEIGHT: 194 LBS | BODY MASS INDEX: 27.16 KG/M2 | HEIGHT: 71 IN

## 2024-02-06 PROCEDURE — 99213 OFFICE O/P EST LOW 20 MIN: CPT

## 2024-02-06 NOTE — PHYSICAL EXAM
[No Acute Distress] : no acute distress [Low Lying Soft Palate] : low lying soft palate [Enlarged Base of the Tongue] : enlarged base of the tongue [Retrognathia] : retrognathia [III] : Mallampati Class: III [Normal Appearance] : normal appearance [No Neck Mass] : no neck mass [Normal Rate/Rhythm] : normal rate/rhythm [Normal S1, S2] : normal s1, s2 [No Murmurs] : no murmurs [No Resp Distress] : no resp distress [Clear to Auscultation Bilaterally] : clear to auscultation bilaterally [No Abnormalities] : no abnormalities [Benign] : benign [Normal Gait] : normal gait [No Clubbing] : no clubbing [No Cyanosis] : no cyanosis [No Edema] : no edema [Normal Color/ Pigmentation] : normal color/ pigmentation [No Focal Deficits] : no focal deficits [Oriented x3] : oriented x3 [Normal Affect] : normal affect

## 2024-02-26 ENCOUNTER — APPOINTMENT (OUTPATIENT)
Dept: CARDIOLOGY | Facility: CLINIC | Age: 64
End: 2024-02-26
Payer: MEDICARE

## 2024-02-26 VITALS
HEART RATE: 79 BPM | DIASTOLIC BLOOD PRESSURE: 64 MMHG | WEIGHT: 198 LBS | OXYGEN SATURATION: 97 % | HEIGHT: 71 IN | SYSTOLIC BLOOD PRESSURE: 98 MMHG | TEMPERATURE: 98.4 F | BODY MASS INDEX: 27.72 KG/M2

## 2024-02-26 DIAGNOSIS — R07.89 OTHER CHEST PAIN: ICD-10-CM

## 2024-02-26 PROCEDURE — 93242 EXT ECG>48HR<7D RECORDING: CPT

## 2024-02-26 PROCEDURE — 93000 ELECTROCARDIOGRAM COMPLETE: CPT

## 2024-02-26 PROCEDURE — 99214 OFFICE O/P EST MOD 30 MIN: CPT | Mod: 25

## 2024-02-26 NOTE — PHYSICAL EXAM
[Normal] : well developed, well nourished, no acute distress [Normal Conjunctiva] : normal conjunctiva [Normal Venous Pressure] : normal venous pressure [No Carotid Bruit] : no carotid bruit [Normal S1, S2] : normal S1, S2 [No Murmur] : no murmur [No Gallop] : no gallop [No Rub] : no rub [Clear Lung Fields] : clear lung fields [Good Air Entry] : good air entry [No Respiratory Distress] : no respiratory distress  [Normal Bowel Sounds] : normal bowel sounds [Soft] : abdomen soft [Normal Gait] : normal gait [No Edema] : no edema [Normal Radial B/L] : normal radial B/L [No Cyanosis] : no cyanosis [Normal DP B/L] : normal DP B/L [Normal PT B/L] : normal PT B/L [Moves all extremities] : moves all extremities [Normal Speech] : normal speech [Alert and Oriented] : alert and oriented [Normal memory] : normal memory

## 2024-02-26 NOTE — DISCUSSION/SUMMARY
[With Me] : with me [Patient] : the patient [___ Month(s)] : in [unfilled] month(s) [EKG obtained to assist in diagnosis and management of assessed problem(s)] : EKG obtained to assist in diagnosis and management of assessed problem(s)
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
## 2024-02-28 NOTE — REVIEW OF SYSTEMS
[Dyspnea on exertion] : dyspnea during exertion [Chest Discomfort] : chest discomfort [Negative] : Heme/Lymph [SOB] : no shortness of breath [Leg Claudication] : no intermittent leg claudication [Lower Ext Edema] : no extremity edema [Palpitations] : no palpitations [Orthopnea] : no orthopnea [PND] : no PND [Syncope] : no syncope [Cough] : no cough [Wheezing] : no wheezing [Coughing Up Blood] : no hemoptysis [Blood in stool] : no blood in stoo [Hematuria] : no hematuria [Dizziness] : no dizziness [Easy Bleeding] : no tendency for easy bleeding

## 2024-03-18 ENCOUNTER — OUTPATIENT (OUTPATIENT)
Dept: OUTPATIENT SERVICES | Facility: HOSPITAL | Age: 64
LOS: 1 days | End: 2024-03-18
Payer: MEDICARE

## 2024-03-18 ENCOUNTER — APPOINTMENT (OUTPATIENT)
Dept: CT IMAGING | Facility: CLINIC | Age: 64
End: 2024-03-18
Payer: MEDICARE

## 2024-03-18 ENCOUNTER — APPOINTMENT (OUTPATIENT)
Dept: NEUROLOGY | Facility: CLINIC | Age: 64
End: 2024-03-18
Payer: MEDICARE

## 2024-03-18 VITALS
DIASTOLIC BLOOD PRESSURE: 80 MMHG | HEIGHT: 71 IN | BODY MASS INDEX: 27.72 KG/M2 | SYSTOLIC BLOOD PRESSURE: 114 MMHG | WEIGHT: 198 LBS

## 2024-03-18 DIAGNOSIS — Z87.898 PERSONAL HISTORY OF OTHER SPECIFIED CONDITIONS: ICD-10-CM

## 2024-03-18 DIAGNOSIS — R41.840 ATTENTION AND CONCENTRATION DEFICIT: ICD-10-CM

## 2024-03-18 DIAGNOSIS — S02.609A FRACTURE OF MANDIBLE, UNSPECIFIED, INITIAL ENCOUNTER FOR CLOSED FRACTURE: Chronic | ICD-10-CM

## 2024-03-18 DIAGNOSIS — R07.89 OTHER CHEST PAIN: ICD-10-CM

## 2024-03-18 DIAGNOSIS — Z98.89 OTHER SPECIFIED POSTPROCEDURAL STATES: Chronic | ICD-10-CM

## 2024-03-18 DIAGNOSIS — Z00.8 ENCOUNTER FOR OTHER GENERAL EXAMINATION: ICD-10-CM

## 2024-03-18 PROCEDURE — G2211 COMPLEX E/M VISIT ADD ON: CPT

## 2024-03-18 PROCEDURE — 75574 CT ANGIO HRT W/3D IMAGE: CPT

## 2024-03-18 PROCEDURE — 99214 OFFICE O/P EST MOD 30 MIN: CPT

## 2024-03-18 PROCEDURE — 75574 CT ANGIO HRT W/3D IMAGE: CPT | Mod: 26

## 2024-03-18 RX ORDER — METOPROLOL TARTRATE 50 MG/1
50 TABLET, FILM COATED ORAL
Qty: 3 | Refills: 0 | Status: COMPLETED | COMMUNITY
Start: 2024-02-26 | End: 2024-03-18

## 2024-03-18 NOTE — CONSULT LETTER
[Dear  ___] : Dear  [unfilled], [Consult Letter:] : I had the pleasure of evaluating your patient, [unfilled]. [Please see my note below.] : Please see my note below. [Consult Closing:] : Thank you very much for allowing me to participate in the care of this patient.  If you have any questions, please do not hesitate to contact me. [Sincerely,] : Sincerely, [FreeTextEntry3] : Ruiz Kaufman MD, PhD, DPN-N\par  [facility]\par  Neurology at [facility]\par  Chief, Division of Neurology\par  [facility]\par

## 2024-03-18 NOTE — HISTORY OF PRESENT ILLNESS
[FreeTextEntry1] : I saw him initially 7/19/2023 with the following history. He  reports trouble concentrating and focusing since childhood.  He was suspected to have ADHD as a child but was never tested.  Says he wants to be further evaluated.  He did graduate high school and went to some college.  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  At one point he was diagnosed with bipolar disorder.  He is a recovering cocaine addict last use 14 months ago.  No significant alcohol use  Has history of hyperlipidemia and hypertension  Also has been diagnosed with sleep apnea but has not followed up with a sleep specialist in a long time  I referred him for an EEG and formal neuropsychological evaluation He never pursued it He returns today, 3/18/2024 saying that he now wishes to undergo further evaluation Complaints remain the same.

## 2024-03-18 NOTE — ASSESSMENT
[FreeTextEntry1] : Long history of concentration difficulties His examination is normal. Possible ADD or ADHD Wants to be further evaluated We will do an EEG and refer him for formal neuropsychological evaluation Further discussions to follow.

## 2024-03-18 NOTE — PHYSICAL EXAM
[General Appearance - Alert] : alert [General Appearance - In No Acute Distress] : in no acute distress [General Appearance - Well-Appearing] : healthy appearing [Oriented To Time, Place, And Person] : oriented to person, place, and time [Impaired Insight] : insight and judgment were intact [Memory Recent] : recent memory was not impaired [Affect] : the affect was normal [Place] : oriented to place [Person] : oriented to person [Time] : oriented to time [Fluency] : fluency intact [Concentration Intact] : normal concentrating ability [Past History] : adequate knowledge of personal past history [Comprehension] : comprehension intact [Cranial Nerves Optic (II)] : visual acuity intact bilaterally,  visual fields full to confrontation, pupils equal round and reactive to light [Cranial Nerves Oculomotor (III)] : extraocular motion intact [Cranial Nerves Vestibulocochlear (VIII)] : hearing was intact bilaterally [Cranial Nerves Trigeminal (V)] : facial sensation intact symmetrically [Cranial Nerves Facial (VII)] : face symmetrical [Cranial Nerves Accessory (XI - Cranial And Spinal)] : head turning and shoulder shrug symmetric [Cranial Nerves Glossopharyngeal (IX)] : tongue and palate midline [Cranial Nerves Hypoglossal (XII)] : there was no tongue deviation with protrusion [Motor Tone] : muscle tone was normal in all four extremities [Motor Strength] : muscle strength was normal in all four extremities [No Muscle Atrophy] : normal bulk in all four extremities [Paresis Pronator Drift Right-Sided] : no pronator drift on the right [Paresis Pronator Drift Left-Sided] : no pronator drift on the left [Sensation Tactile Decrease] : light touch was intact [Sensation Pain / Temperature Decrease] : pain and temperature was intact [Romberg's Sign] : Romberg's sign was negtive [Abnormal Walk] : normal gait [Balance] : balance was intact [Tremor] : no tremor present [Past-pointing] : there was no past-pointing [Coordination - Dysmetria Impaired Heel-to-Shin Bilateral] : not present [Coordination - Dysmetria Impaired Finger-to-Nose Bilateral] : not present [2+] : Ankle jerk left 2+ [Plantar Reflex Right Only] : normal on the right [Plantar Reflex Left Only] : normal on the left [PERRL With Normal Accommodation] : pupils were equal in size, round, reactive to light, with normal accommodation [Extraocular Movements] : extraocular movements were intact [Full Visual Field] : full visual field

## 2024-03-21 ENCOUNTER — APPOINTMENT (OUTPATIENT)
Dept: NEUROLOGY | Facility: CLINIC | Age: 64
End: 2024-03-21

## 2024-03-27 ENCOUNTER — APPOINTMENT (OUTPATIENT)
Dept: NEUROLOGY | Facility: CLINIC | Age: 64
End: 2024-03-27
Payer: MEDICARE

## 2024-03-27 PROCEDURE — 93040 RHYTHM ECG WITH REPORT: CPT

## 2024-03-27 PROCEDURE — 95819 EEG AWAKE AND ASLEEP: CPT

## 2024-04-04 ENCOUNTER — APPOINTMENT (OUTPATIENT)
Dept: UROLOGY | Facility: CLINIC | Age: 64
End: 2024-04-04
Payer: MEDICARE

## 2024-04-04 VITALS
OXYGEN SATURATION: 98 % | DIASTOLIC BLOOD PRESSURE: 71 MMHG | HEIGHT: 71 IN | WEIGHT: 195 LBS | BODY MASS INDEX: 27.3 KG/M2 | RESPIRATION RATE: 16 BRPM | SYSTOLIC BLOOD PRESSURE: 136 MMHG | HEART RATE: 54 BPM

## 2024-04-04 LAB
BILIRUB UR QL STRIP: NORMAL
CLARITY UR: CLEAR
COLLECTION METHOD: NORMAL
GLUCOSE UR-MCNC: NORMAL
HCG UR QL: 0.2
HGB UR QL STRIP.AUTO: NORMAL
KETONES UR-MCNC: NORMAL
LEUKOCYTE ESTERASE UR QL STRIP: NORMAL
NITRITE UR QL STRIP: NORMAL
PH UR STRIP: 6
PROT UR STRIP-MCNC: NORMAL
SP GR UR STRIP: 1.02

## 2024-04-04 PROCEDURE — 99203 OFFICE O/P NEW LOW 30 MIN: CPT | Mod: 25

## 2024-04-04 PROCEDURE — 81003 URINALYSIS AUTO W/O SCOPE: CPT | Mod: QW

## 2024-04-04 PROCEDURE — 51798 US URINE CAPACITY MEASURE: CPT

## 2024-04-04 NOTE — PHYSICAL EXAM
[General Appearance - Well Developed] : well developed [General Appearance - Well Nourished] : well nourished [General Appearance - In No Acute Distress] : no acute distress [] : no respiratory distress [Urethral Meatus] : meatus normal [Penis Abnormality] : normal circumcised penis [Testes Tenderness] : no tenderness of the testes [Testes Mass (___cm)] : there were no testicular masses [Prostate Tenderness] : the prostate was not tender [Prostate Size ___ (0-4)] : prostate size [unfilled] (scale: 0-4) [Oriented To Time, Place, And Person] : oriented to person, place, and time

## 2024-04-16 LAB — PSA SERPL-MCNC: 3.22 NG/ML

## 2024-04-17 LAB
TESTOST FREE SERPL-MCNC: 7.7 PG/ML
TESTOST SERPL-MCNC: 390 NG/DL

## 2024-04-18 NOTE — ASSESSMENT
[FreeTextEntry1] : AUASS 20 u/a and pvr reviewed advise tamsulosin , precautions reviewed, PSA, testosterone level and f/u with kidney/blader ultrasound.

## 2024-04-18 NOTE — HISTORY OF PRESENT ILLNESS
[FreeTextEntry1] : difficulty voiding x several mos [Urinary Incontinence] : urinary incontinence [Urinary Retention] : urinary retention [Urinary Urgency] : urinary urgency [Urinary Frequency] : urinary frequency [Straining] : straining [Weak Stream] : weak stream [Erectile Dysfunction] : Erectile Dysfunction [Dysuria] : no dysuria [Hematuria - Gross] : no gross hematuria [None] : None

## 2024-04-20 ENCOUNTER — NON-APPOINTMENT (OUTPATIENT)
Age: 64
End: 2024-04-20

## 2024-04-24 ENCOUNTER — NON-APPOINTMENT (OUTPATIENT)
Age: 64
End: 2024-04-24

## 2024-04-29 NOTE — DISCUSSION/SUMMARY
[FreeTextEntry1] : Assessment  MOUSTAPHA (obstructive sleep apnea) (327.23) (G47.33) PTSD.  Plan   PSG 2 month FU for APAP Earplugs and white noise

## 2024-05-01 ENCOUNTER — APPOINTMENT (OUTPATIENT)
Dept: UROLOGY | Facility: CLINIC | Age: 64
End: 2024-05-01

## 2024-05-02 ENCOUNTER — OUTPATIENT (OUTPATIENT)
Dept: OUTPATIENT SERVICES | Facility: HOSPITAL | Age: 64
LOS: 1 days | End: 2024-05-02

## 2024-05-02 ENCOUNTER — APPOINTMENT (OUTPATIENT)
Dept: ULTRASOUND IMAGING | Facility: CLINIC | Age: 64
End: 2024-05-02
Payer: MEDICARE

## 2024-05-02 DIAGNOSIS — N40.1 BENIGN PROSTATIC HYPERPLASIA WITH LOWER URINARY TRACT SYMPTOMS: ICD-10-CM

## 2024-05-02 DIAGNOSIS — S02.609A FRACTURE OF MANDIBLE, UNSPECIFIED, INITIAL ENCOUNTER FOR CLOSED FRACTURE: Chronic | ICD-10-CM

## 2024-05-02 DIAGNOSIS — Z98.89 OTHER SPECIFIED POSTPROCEDURAL STATES: Chronic | ICD-10-CM

## 2024-05-02 PROCEDURE — 76770 US EXAM ABDO BACK WALL COMP: CPT | Mod: 26

## 2024-05-08 ENCOUNTER — APPOINTMENT (OUTPATIENT)
Dept: ELECTROPHYSIOLOGY | Facility: CLINIC | Age: 64
End: 2024-05-08
Payer: MEDICARE

## 2024-05-08 VITALS
HEIGHT: 71 IN | BODY MASS INDEX: 27.72 KG/M2 | OXYGEN SATURATION: 97 % | SYSTOLIC BLOOD PRESSURE: 120 MMHG | DIASTOLIC BLOOD PRESSURE: 68 MMHG | HEART RATE: 79 BPM | WEIGHT: 198 LBS

## 2024-05-08 DIAGNOSIS — I47.10 SUPRAVENTRICULAR TACHYCARDIA, UNSPECIFIED: ICD-10-CM

## 2024-05-08 DIAGNOSIS — Z87.898 PERSONAL HISTORY OF OTHER SPECIFIED CONDITIONS: ICD-10-CM

## 2024-05-08 PROCEDURE — 93000 ELECTROCARDIOGRAM COMPLETE: CPT

## 2024-05-08 PROCEDURE — 99203 OFFICE O/P NEW LOW 30 MIN: CPT | Mod: 25

## 2024-05-08 NOTE — HISTORY OF PRESENT ILLNESS
[FreeTextEntry1] : The patient is a 63-year-old male referred for arrhythmia management. The patient has a history of BPH, HTN, near syncope, cocaine use (recovering) and probable SVT. In 11/2023 the patient presented to the ER with near syncope. He was found to be hypotensive which was attributed to starting new antihypertensive medications. He denies any new episodes since. TTE revealed preserved EF and recent CCTA shows only mild CAD. He also has a reported history of SVT in the chart. The patient is unaware of this diagnosis. He only notes brief rare palpitations. He wore a Holter monitor for 3 days in 3/2023 which did not reveal any significant arrhythmias (one episode is labelled SVT but appears consistent with sinus tachycardia).

## 2024-05-08 NOTE — DISCUSSION/SUMMARY
[FreeTextEntry1] : In summary, the patient is a 63-year-old male with a history of HTN, BPH, near syncope and reported SVT. The near syncopal episode appears to be related to medication related hypotension and warrants no further work-up. TTE and Holter were unremarkable. He denies any SVT history and appears to have no significant symptoms. One event labelled as SVT on HM appears to be sinus tachycardia. No further evaluation or treatment is necessary at present. If he has frequent symptoms in the future outpatient monitoring can be repeated.  [EKG obtained to assist in diagnosis and management of assessed problem(s)] : EKG obtained to assist in diagnosis and management of assessed problem(s)

## 2024-05-09 ENCOUNTER — APPOINTMENT (OUTPATIENT)
Dept: UROLOGY | Facility: CLINIC | Age: 64
End: 2024-05-09

## 2024-05-10 ENCOUNTER — APPOINTMENT (OUTPATIENT)
Dept: UROLOGY | Facility: CLINIC | Age: 64
End: 2024-05-10
Payer: MEDICARE

## 2024-05-10 VITALS
SYSTOLIC BLOOD PRESSURE: 121 MMHG | BODY MASS INDEX: 27.3 KG/M2 | DIASTOLIC BLOOD PRESSURE: 80 MMHG | WEIGHT: 195 LBS | HEIGHT: 71 IN | HEART RATE: 74 BPM

## 2024-05-10 DIAGNOSIS — N40.0 BENIGN PROSTATIC HYPERPLASIA WITHOUT LOWER URINARY TRACT SYMPMS: ICD-10-CM

## 2024-05-10 LAB
BILIRUB UR QL STRIP: NORMAL
CLARITY UR: CLEAR
COLLECTION METHOD: NORMAL
GLUCOSE UR-MCNC: NORMAL
HCG UR QL: 0.2 EU/DL
HGB UR QL STRIP.AUTO: NORMAL
KETONES UR-MCNC: NORMAL
LEUKOCYTE ESTERASE UR QL STRIP: NORMAL
NITRITE UR QL STRIP: NORMAL
PH UR STRIP: 6
PROT UR STRIP-MCNC: NORMAL
SP GR UR STRIP: 1.02

## 2024-05-10 PROCEDURE — 99213 OFFICE O/P EST LOW 20 MIN: CPT | Mod: 25

## 2024-05-10 PROCEDURE — 81003 URINALYSIS AUTO W/O SCOPE: CPT | Mod: QW

## 2024-05-10 RX ORDER — TAMSULOSIN HYDROCHLORIDE 0.4 MG/1
0.4 CAPSULE ORAL
Qty: 30 | Refills: 5 | Status: DISCONTINUED | COMMUNITY
Start: 2024-04-04 | End: 2024-05-10

## 2024-05-10 RX ORDER — ALFUZOSIN HYDROCHLORIDE 10 MG/1
10 TABLET, EXTENDED RELEASE ORAL
Qty: 30 | Refills: 6 | Status: ACTIVE | COMMUNITY
Start: 2024-05-10 | End: 1900-01-01

## 2024-05-10 RX ORDER — TAMSULOSIN HYDROCHLORIDE 0.4 MG/1
0.4 CAPSULE ORAL DAILY
Refills: 0 | Status: DISCONTINUED | COMMUNITY
Start: 2017-07-31 | End: 2024-05-10

## 2024-05-10 NOTE — ED ADULT TRIAGE NOTE - NS ED TRIAGE AVPU SCALE
Improved Alert-The patient is alert, awake and responds to voice. The patient is oriented to time, place, and person. The triage nurse is able to obtain subjective information.

## 2024-05-15 ENCOUNTER — OUTPATIENT (OUTPATIENT)
Dept: OUTPATIENT SERVICES | Facility: HOSPITAL | Age: 64
LOS: 1 days | End: 2024-05-15
Payer: MEDICARE

## 2024-05-15 DIAGNOSIS — Z98.89 OTHER SPECIFIED POSTPROCEDURAL STATES: Chronic | ICD-10-CM

## 2024-05-15 DIAGNOSIS — S02.609A FRACTURE OF MANDIBLE, UNSPECIFIED, INITIAL ENCOUNTER FOR CLOSED FRACTURE: Chronic | ICD-10-CM

## 2024-05-15 DIAGNOSIS — G47.33 OBSTRUCTIVE SLEEP APNEA (ADULT) (PEDIATRIC): ICD-10-CM

## 2024-05-15 PROCEDURE — 95800 SLP STDY UNATTENDED: CPT | Mod: 26

## 2024-05-15 PROCEDURE — 95800 SLP STDY UNATTENDED: CPT

## 2024-05-24 ENCOUNTER — APPOINTMENT (OUTPATIENT)
Dept: UROLOGY | Facility: CLINIC | Age: 64
End: 2024-05-24
Payer: MEDICARE

## 2024-05-24 DIAGNOSIS — R32 UNSPECIFIED URINARY INCONTINENCE: ICD-10-CM

## 2024-05-24 DIAGNOSIS — N40.1 BENIGN PROSTATIC HYPERPLASIA WITH LOWER URINARY TRACT SYMPMS: ICD-10-CM

## 2024-05-24 LAB
BILIRUB UR QL STRIP: NORMAL
CLARITY UR: CLEAR
COLLECTION METHOD: NORMAL
GLUCOSE UR-MCNC: NORMAL
HCG UR QL: 0.2 EU/DL
HGB UR QL STRIP.AUTO: NORMAL
KETONES UR-MCNC: NORMAL
LEUKOCYTE ESTERASE UR QL STRIP: NORMAL
NITRITE UR QL STRIP: NORMAL
PH UR STRIP: 5.5
PROT UR STRIP-MCNC: ABNORMAL
SP GR UR STRIP: 1.03

## 2024-05-24 PROCEDURE — 81003 URINALYSIS AUTO W/O SCOPE: CPT | Mod: QW

## 2024-05-24 PROCEDURE — 52000 CYSTOURETHROSCOPY: CPT

## 2024-05-27 ENCOUNTER — EMERGENCY (EMERGENCY)
Facility: HOSPITAL | Age: 64
LOS: 1 days | Discharge: HOME | End: 2024-05-27
Attending: EMERGENCY MEDICINE
Payer: MEDICARE

## 2024-05-27 VITALS
OXYGEN SATURATION: 92 % | RESPIRATION RATE: 20 BRPM | TEMPERATURE: 98 F | DIASTOLIC BLOOD PRESSURE: 53 MMHG | HEART RATE: 117 BPM | SYSTOLIC BLOOD PRESSURE: 74 MMHG

## 2024-05-27 VITALS
DIASTOLIC BLOOD PRESSURE: 74 MMHG | HEART RATE: 76 BPM | SYSTOLIC BLOOD PRESSURE: 123 MMHG | RESPIRATION RATE: 20 BRPM | OXYGEN SATURATION: 95 %

## 2024-05-27 DIAGNOSIS — S02.609A FRACTURE OF MANDIBLE, UNSPECIFIED, INITIAL ENCOUNTER FOR CLOSED FRACTURE: Chronic | ICD-10-CM

## 2024-05-27 DIAGNOSIS — Z98.89 OTHER SPECIFIED POSTPROCEDURAL STATES: Chronic | ICD-10-CM

## 2024-05-27 LAB
ANION GAP SERPL CALC-SCNC: 16 MMOL/L — SIGNIFICANT CHANGE UP (ref 5–17)
BUN SERPL-MCNC: 15.9 MG/DL — SIGNIFICANT CHANGE UP (ref 8–20)
CALCIUM SERPL-MCNC: 9.2 MG/DL — SIGNIFICANT CHANGE UP (ref 8.4–10.5)
CHLORIDE SERPL-SCNC: 102 MMOL/L — SIGNIFICANT CHANGE UP (ref 96–108)
CO2 SERPL-SCNC: 24 MMOL/L — SIGNIFICANT CHANGE UP (ref 22–29)
CREAT SERPL-MCNC: 1.01 MG/DL — SIGNIFICANT CHANGE UP (ref 0.5–1.3)
EGFR: 82 ML/MIN/1.73M2 — SIGNIFICANT CHANGE UP
GLUCOSE SERPL-MCNC: 107 MG/DL — HIGH (ref 70–99)
HCT VFR BLD CALC: 40.7 % — SIGNIFICANT CHANGE UP (ref 39–50)
HGB BLD-MCNC: 13.1 G/DL — SIGNIFICANT CHANGE UP (ref 13–17)
MCHC RBC-ENTMCNC: 23.2 PG — LOW (ref 27–34)
MCHC RBC-ENTMCNC: 32.2 GM/DL — SIGNIFICANT CHANGE UP (ref 32–36)
MCV RBC AUTO: 72 FL — LOW (ref 80–100)
PLATELET # BLD AUTO: 156 K/UL — SIGNIFICANT CHANGE UP (ref 150–400)
POTASSIUM SERPL-MCNC: 3.5 MMOL/L — SIGNIFICANT CHANGE UP (ref 3.5–5.3)
POTASSIUM SERPL-SCNC: 3.5 MMOL/L — SIGNIFICANT CHANGE UP (ref 3.5–5.3)
RBC # BLD: 5.65 M/UL — SIGNIFICANT CHANGE UP (ref 4.2–5.8)
RBC # FLD: 16.5 % — HIGH (ref 10.3–14.5)
SODIUM SERPL-SCNC: 142 MMOL/L — SIGNIFICANT CHANGE UP (ref 135–145)
WBC # BLD: 3.35 K/UL — LOW (ref 3.8–10.5)
WBC # FLD AUTO: 3.35 K/UL — LOW (ref 3.8–10.5)

## 2024-05-27 PROCEDURE — 99284 EMERGENCY DEPT VISIT MOD MDM: CPT | Mod: 25

## 2024-05-27 PROCEDURE — 71045 X-RAY EXAM CHEST 1 VIEW: CPT | Mod: 26

## 2024-05-27 PROCEDURE — 96375 TX/PRO/DX INJ NEW DRUG ADDON: CPT

## 2024-05-27 PROCEDURE — 71045 X-RAY EXAM CHEST 1 VIEW: CPT

## 2024-05-27 PROCEDURE — 99284 EMERGENCY DEPT VISIT MOD MDM: CPT

## 2024-05-27 PROCEDURE — 96374 THER/PROPH/DIAG INJ IV PUSH: CPT

## 2024-05-27 PROCEDURE — 96372 THER/PROPH/DIAG INJ SC/IM: CPT | Mod: XU

## 2024-05-27 PROCEDURE — 80048 BASIC METABOLIC PNL TOTAL CA: CPT

## 2024-05-27 PROCEDURE — 85025 COMPLETE CBC W/AUTO DIFF WBC: CPT

## 2024-05-27 PROCEDURE — 36415 COLL VENOUS BLD VENIPUNCTURE: CPT

## 2024-05-27 RX ORDER — FAMOTIDINE 10 MG/ML
20 INJECTION INTRAVENOUS ONCE
Refills: 0 | Status: COMPLETED | OUTPATIENT
Start: 2024-05-27 | End: 2024-05-27

## 2024-05-27 RX ORDER — DIPHENHYDRAMINE HCL 50 MG
25 CAPSULE ORAL ONCE
Refills: 0 | Status: COMPLETED | OUTPATIENT
Start: 2024-05-27 | End: 2024-05-27

## 2024-05-27 RX ORDER — SODIUM CHLORIDE 9 MG/ML
1000 INJECTION INTRAMUSCULAR; INTRAVENOUS; SUBCUTANEOUS ONCE
Refills: 0 | Status: COMPLETED | OUTPATIENT
Start: 2024-05-27 | End: 2024-05-27

## 2024-05-27 RX ORDER — EPINEPHRINE 0.3 MG/.3ML
0.3 INJECTION INTRAMUSCULAR; SUBCUTANEOUS ONCE
Refills: 0 | Status: COMPLETED | OUTPATIENT
Start: 2024-05-27 | End: 2024-05-27

## 2024-05-27 RX ADMIN — EPINEPHRINE 0.3 MILLIGRAM(S): 0.3 INJECTION INTRAMUSCULAR; SUBCUTANEOUS at 19:59

## 2024-05-27 RX ADMIN — FAMOTIDINE 20 MILLIGRAM(S): 10 INJECTION INTRAVENOUS at 14:45

## 2024-05-27 RX ADMIN — SODIUM CHLORIDE 1000 MILLILITER(S): 9 INJECTION INTRAMUSCULAR; INTRAVENOUS; SUBCUTANEOUS at 14:51

## 2024-05-27 RX ADMIN — EPINEPHRINE 0.3 MILLIGRAM(S): 0.3 INJECTION INTRAMUSCULAR; SUBCUTANEOUS at 14:35

## 2024-05-27 RX ADMIN — Medication 25 MILLIGRAM(S): at 14:46

## 2024-05-27 RX ADMIN — Medication 125 MILLIGRAM(S): at 14:45

## 2024-05-27 NOTE — ED PROVIDER NOTE - CLINICAL SUMMARY MEDICAL DECISION MAKING FREE TEXT BOX
64-year-old male history of pecan allergy presenting for evaluation status post having a smoothie with abrupt onset of shortness of breath, mouth swelling, on examination with poor air movement through the bilateral lungs with mild end expiratory wheezing.  Oxygen saturation in the high 80s, given 1 dose of IM epi, 25 mg IV Benadryl as patient already taken 25 mg tablet at home, 20 mg Pepcid, 125 Solu-Medrol.  Will maintain on monitor, check EKG and basic lab work chest x-ray; follow-up studies, reassess, disposition pending clinical course.

## 2024-05-27 NOTE — ED PROVIDER NOTE - CARE PROVIDER_API CALL
Olayinka Iqbal J  Allergy and Immunology  [address] [place] [address]  Phone: [phone]  Fax: [phone]  Follow Up Time:

## 2024-05-27 NOTE — ED PROVIDER NOTE - PATIENT PORTAL LINK FT
You can access the FollowMyHealth Patient Portal offered by [facility] by registering at the following website: [email or web address]. By joining [employer]’s FollowMyHealth portal, you will also be able to view your health information using other applications (apps) compatible with our system.

## 2024-05-27 NOTE — ED PROVIDER NOTE - PROGRESS NOTE DETAILS
Lee: Observed in the ER for approx 5 hrs after epinephrine given, lungs CTA and maintaining normal BP since about 45 mins after IM epi. Pt feels improved, denies CP, sob, itchiness. Gait intact. Tongue swelling has resolved, still has mild swelling of the bottom lip but protecting airway. Given IM epipen prior to discharge and allergy/immunology follow up.

## 2024-05-27 NOTE — ED PROVIDER NOTE - OBJECTIVE STATEMENT
64-year-old male history of pecan allergy presenting after having a smoothie at his friend's house and developing difficulty breathing.  Came in through triage, states he feels very short of breath.  Tried taking 125 mg Benadryl tablet but did not have improvement subsequently came here.  Has needed epinephrine once in the past for similar reaction.  States that his smoothie did not have any pecans in it but did have some sort of granola mixture.  Denies other symptoms of acute concern at this time including chest pain, nausea vomiting, or other recent cold symptoms.

## 2024-05-27 NOTE — ED PROVIDER NOTE - PHYSICAL EXAMINATION
Gen: NAD, non-toxic, conversational  Eyes: PERRLA, EOMI   HENT: Normocephalic, atraumatic. External ears normal, no rhinorrhea, moist mucous membranes  CV: RRR, no M/R/G  Resp: poor air movement with mild expiratory wheezing, O2 saturation 89% on RA, tachypnea   Abd: soft, non tender, non rigid, no guarding or rebound tenderness  Back: No CVAT bilaterally, no midline ttp  Skin: dry, wwp   Neuro: AOx3, speech is fluent and appropriate  Psych: Mood ok, affect euthymic

## 2024-05-27 NOTE — ED PROVIDER NOTE - NSFOLLOWUPINSTRUCTIONS_ED_ALL_ED_FT
- An epi pen has been provided for you to use if you have another reaction like the one you suffered today. If you start having symptoms like tightness in the chest, shortness of breath, itchiness, and rash use it in your leg and come to the ER right away.  - Follow up with an allergist/immunologist for full allergen testing.    Anaphylaxis    An anaphylactic reaction (anaphylaxis) is a sudden, severe allergic reaction that affects multiple areas of the body. An allergic reaction is an abnormal reaction to a substance (allergen) by the body's defense system. Common allergens include medicines, food, insect bites or stings, and blood products. The body releases certain proteins into the blood that can cause a variety of symptoms such as an itchy rash, wheezing, swelling of the face/lips/tongue/throat, abdominal pain, nausea or vomiting. An allergic reaction is usually treated with medication. If your health care provider prescribed you an epinephrine injection device, make sure to keep it with you at all times.    SEEK IMMEDIATE MEDICAL CARE IF YOU HAVE ANY OF THE FOLLOWING SYMPTOMS: allergic reaction severe enough that required you to use epinephrine, tightness in your chest, swelling around your lips/tongue/throat, abdominal pain, vomiting or diarrhea, or lightheadedness/dizziness. These symptoms may represent a serious problem that is an emergency. Do not wait to see if the symptoms will go away. Use your auto-injector pen or anaphylaxis kit as you have been instructed. Call 911 and do not drive yourself to the hospital.

## 2024-05-27 NOTE — ED ADULT TRIAGE NOTE - CHIEF COMPLAINT QUOTE
pt c/o anaphlaxis to something in a smoothie, face is swollen  A&Ox3, resp feels like throat is closing

## 2024-05-27 NOTE — ED ADULT NURSE NOTE - OBJECTIVE STATEMENT
Pt A&Ox4, rr even and unlabored. PMHx pecan allergy.  Pt presents with allergic reaction after having a smoothie at his friends house with possible pecan nuts in it, causing difficulty breathing and swollen face. Pt took benadryl PTA but had no improvement. Pt states he has needed epinephrine in the past for allergic reactions. Denies CP, N/V, numbness/tingling.

## 2024-05-28 LAB
BASOPHILS # BLD AUTO: 0 K/UL — SIGNIFICANT CHANGE UP (ref 0–0.2)
BASOPHILS NFR BLD AUTO: 0 % — SIGNIFICANT CHANGE UP (ref 0–2)
DACRYOCYTES BLD QL SMEAR: SLIGHT — SIGNIFICANT CHANGE UP
ELLIPTOCYTES BLD QL SMEAR: SLIGHT — SIGNIFICANT CHANGE UP
EOSINOPHIL # BLD AUTO: 0.03 K/UL — SIGNIFICANT CHANGE UP (ref 0–0.5)
EOSINOPHIL NFR BLD AUTO: 0.9 % — SIGNIFICANT CHANGE UP (ref 0–6)
GIANT PLATELETS BLD QL SMEAR: PRESENT — SIGNIFICANT CHANGE UP
LYMPHOCYTES # BLD AUTO: 1.85 K/UL — SIGNIFICANT CHANGE UP (ref 1–3.3)
LYMPHOCYTES # BLD AUTO: 55.3 % — HIGH (ref 13–44)
MANUAL SMEAR VERIFICATION: SIGNIFICANT CHANGE UP
MONOCYTES # BLD AUTO: 0.03 K/UL — SIGNIFICANT CHANGE UP (ref 0–0.9)
MONOCYTES NFR BLD AUTO: 0.9 % — LOW (ref 2–14)
NEUTROPHILS # BLD AUTO: 1.17 K/UL — LOW (ref 1.8–7.4)
NEUTROPHILS NFR BLD AUTO: 32.4 % — LOW (ref 43–77)
NEUTS BAND # BLD: 2.6 % — SIGNIFICANT CHANGE UP (ref 0–8)
OVALOCYTES BLD QL SMEAR: SLIGHT — SIGNIFICANT CHANGE UP
PLAT MORPH BLD: NORMAL — SIGNIFICANT CHANGE UP
POIKILOCYTOSIS BLD QL AUTO: SLIGHT — SIGNIFICANT CHANGE UP
POLYCHROMASIA BLD QL SMEAR: SLIGHT — SIGNIFICANT CHANGE UP
RBC BLD AUTO: ABNORMAL
SMUDGE CELLS # BLD: PRESENT — SIGNIFICANT CHANGE UP
VARIANT LYMPHS # BLD: 7.9 % — HIGH (ref 0–6)

## 2024-06-03 ENCOUNTER — APPOINTMENT (OUTPATIENT)
Dept: CARDIOLOGY | Facility: CLINIC | Age: 64
End: 2024-06-03
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
## 2024-06-04 ENCOUNTER — APPOINTMENT (OUTPATIENT)
Dept: PULMONOLOGY | Facility: CLINIC | Age: 64
End: 2024-06-04
Payer: MEDICARE

## 2024-06-04 VITALS
DIASTOLIC BLOOD PRESSURE: 70 MMHG | HEART RATE: 62 BPM | RESPIRATION RATE: 16 BRPM | OXYGEN SATURATION: 97 % | SYSTOLIC BLOOD PRESSURE: 124 MMHG

## 2024-06-04 DIAGNOSIS — G47.33 OBSTRUCTIVE SLEEP APNEA (ADULT) (PEDIATRIC): ICD-10-CM

## 2024-06-04 PROCEDURE — 99213 OFFICE O/P EST LOW 20 MIN: CPT

## 2024-06-04 NOTE — RESULTS/DATA
[TextEntry] : Night one HST 7/16/21: LEONARD=5.8 WP HST on 5/15/24: AHI=15.1: REM AHI=22.8; Supine AHI=20.4

## 2024-06-04 NOTE — DISCUSSION/SUMMARY
[FreeTextEntry1] : Assessment  Moderate MOUSTAPHA (obstructive sleep apnea) (327.23) (G47.33) PTSD.  Plan  Nasal APAP 3 month FU  Earplugs and white noise
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
## 2024-06-15 PROBLEM — N40.0 BPH (BENIGN PROSTATIC HYPERPLASIA): Status: RESOLVED | Noted: 2017-02-17 | Resolved: 2024-06-15

## 2024-06-15 NOTE — PHYSICAL EXAM
[General Appearance - Well Developed] : well developed [General Appearance - Well Nourished] : well nourished [General Appearance - In No Acute Distress] : no acute distress [] : no respiratory distress [Oriented To Time, Place, And Person] : oriented to person, place, and time done

## 2024-06-15 NOTE — HISTORY OF PRESENT ILLNESS
[FreeTextEntry1] : o tamsulosin, c/o retrograde ejaculation [Urinary Retention] : urinary retention [Urinary Urgency] : urinary urgency [Intermittency] : intermittency [Dysuria] : no dysuria [Hematuria - Gross] : no gross hematuria [None] : None

## 2024-06-15 NOTE — ASSESSMENT
[FreeTextEntry1] : AUASS 21 sono and labs, u/a reviewed.  trial of alfuzosin 10 mg po daily, precautions reviewed f/u for cysto

## 2024-06-21 NOTE — ED ADULT NURSE REASSESSMENT NOTE - NS ED NURSE REASSESS COMMENT FT1
[facility] NEUROSURGERY [facility]  [address]  [address]  [facility] [address]  Dept: [phone]    Patient:  Brennen Mcclure  YOB: 1965  Date: 6/21/24    The patient is a 59 y.o. male who presents today for consult of the following problems:     Chief Complaint   Patient presents with    Post-Op Check         HPI:     Brennen Mcclure is a 59 y.o. male who presents to the office for post-op evaluation s/p   right cranioplasty for right sided skull defect status post craniectomy.     Patient has a history of right sided thrombosis with infarct he underwent a right sided decompressive hemicraniectomy and placed on Eliquis and Aspirin in November 2023. Patient had multiple episodes of significant thrombocytopenia, ITP treated with IVIG, he is following hematology.  Patient presented 6/4/24 as a direct admit for planned cranioplasty with synthetic allograft.     6/4/24- Cranioplasty with synthetic custom peek implant right side, later that night patient became somnolent with dysarthric speech and flaccid weakness on the left and 3/5 on the right. CT head showed an intraparenchymal hematoma at the right temporal lobe, increased extra-axial hemorrhage over right cerebral convexity with 1.4 cm right to left midline shift.   6/6/24- Patient underwent a craniotomy for intrasubdural hematoma evacuation.   6/17/24 Patient was discharged to inpatient rehab [facility].    Patient presents today with his wife, he is in a wheelchair and is wearing a cranial protection helmet. Patient and his wife are tearful at the appointment, the are unhappy with the care the patient is receiving at the rehab facility. Patient is currently in physical and occupational therapy. Patient denies any new headaches, weakness, vision changes, numbness or tingling, nausea or vomiting. No seizure like activity or increase in confusion. No 
Assumed pt care at 1700 from critical. Pt presented from allergic reaction to pecan nuts per pt. Pt is being monitored after epinephrine 0.3mg IM administration. Pt is resting in bed with bed rails up and bed in lowest position. Pt has no complaints at this point. NSR on monitor.
Pt A&Ox4, rr even and unlabored. Pt is resting in bed with bed rails up and in lowest position. Pt has no complaints at this time. NSR on monitor.

## 2024-06-28 ENCOUNTER — APPOINTMENT (OUTPATIENT)
Dept: UROLOGY | Facility: CLINIC | Age: 64
End: 2024-06-28

## 2024-08-27 ENCOUNTER — APPOINTMENT (OUTPATIENT)
Dept: CARDIOLOGY | Facility: CLINIC | Age: 64
End: 2024-08-27
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
## 2024-09-03 ENCOUNTER — APPOINTMENT (OUTPATIENT)
Dept: PULMONOLOGY | Facility: CLINIC | Age: 64
End: 2024-09-03
Payer: MEDICAID

## 2024-09-03 VITALS
OXYGEN SATURATION: 98 % | WEIGHT: 198 LBS | SYSTOLIC BLOOD PRESSURE: 122 MMHG | BODY MASS INDEX: 27.72 KG/M2 | DIASTOLIC BLOOD PRESSURE: 72 MMHG | HEIGHT: 71 IN | RESPIRATION RATE: 16 BRPM | HEART RATE: 65 BPM

## 2024-09-03 DIAGNOSIS — G47.33 OBSTRUCTIVE SLEEP APNEA (ADULT) (PEDIATRIC): ICD-10-CM

## 2024-09-03 PROCEDURE — 99203 OFFICE O/P NEW LOW 30 MIN: CPT

## 2024-09-03 PROCEDURE — 99213 OFFICE O/P EST LOW 20 MIN: CPT

## 2024-09-03 RX ORDER — MAGNESIUM OXIDE/MAG AA CHELATE 300 MG
CAPSULE ORAL
Refills: 0 | Status: ACTIVE | COMMUNITY

## 2024-09-03 RX ORDER — OMEPRAZOLE 40 MG/1
40 CAPSULE, DELAYED RELEASE ORAL
Refills: 0 | Status: ACTIVE | COMMUNITY

## 2024-09-03 NOTE — DISCUSSION/SUMMARY
[FreeTextEntry1] : Assessment  Moderate MOUSTAPHA (obstructive sleep apnea) (327.23) (G47.33) PTSD. CPAP failure  Plan  3 month FU  Earplugs and white noise Change home location DC CPAP Consider MAD in the future if covered.
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
## 2024-09-04 ENCOUNTER — APPOINTMENT (OUTPATIENT)
Dept: UROLOGY | Facility: CLINIC | Age: 64
End: 2024-09-04

## 2024-09-19 ENCOUNTER — APPOINTMENT (OUTPATIENT)
Dept: UROLOGY | Facility: CLINIC | Age: 64
End: 2024-09-19

## 2024-09-19 DIAGNOSIS — R32 UNSPECIFIED URINARY INCONTINENCE: ICD-10-CM

## 2024-09-19 PROCEDURE — 99213 OFFICE O/P EST LOW 20 MIN: CPT

## 2024-10-01 ENCOUNTER — APPOINTMENT (OUTPATIENT)
Dept: NEUROLOGY | Facility: CLINIC | Age: 64
End: 2024-10-01
Payer: MEDICARE

## 2024-10-01 VITALS
BODY MASS INDEX: 27.72 KG/M2 | DIASTOLIC BLOOD PRESSURE: 68 MMHG | HEART RATE: 81 BPM | SYSTOLIC BLOOD PRESSURE: 109 MMHG | HEIGHT: 71 IN | WEIGHT: 198 LBS

## 2024-10-01 DIAGNOSIS — F90.9 ATTENTION-DEFICIT HYPERACTIVITY DISORDER, UNSPECIFIED TYPE: ICD-10-CM

## 2024-10-01 DIAGNOSIS — G62.9 POLYNEUROPATHY, UNSPECIFIED: ICD-10-CM

## 2024-10-01 PROCEDURE — G2211 COMPLEX E/M VISIT ADD ON: CPT

## 2024-10-01 PROCEDURE — 99214 OFFICE O/P EST MOD 30 MIN: CPT

## 2024-10-01 NOTE — HISTORY OF PRESENT ILLNESS
[FreeTextEntry1] : I saw him initially 7/19/2023 with the following history. He  reports trouble concentrating and focusing since childhood.  He was suspected to have ADHD as a child but was never tested.  Says he wants to be further evaluated.  He did graduate high school and went to some college.  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  At one point he was diagnosed with bipolar disorder.  He is a recovering cocaine addict last use 14 months ago.  No significant alcohol use  Has history of hyperlipidemia and hypertension  Also has been diagnosed with sleep apnea but has not followed up with a sleep specialist in a long time  I referred him for an EEG and formal neuropsychological evaluation He never pursued it He returned, 3/18/2024 saying that he now wishes to undergo further evaluation Complaints remain the same.  He returns today, 10/1/2024 for follow-up EEG was normal He underwent neuropsychological evaluation.  I never received the report but he has a copy on his phone They diagnosed him with ADHD, posttraumatic stress disorder, and depression  He reports a new problem.  Reports about a 2-month history of intermittent pain over the lateral aspect of the right leg below the knee Denies significant leg crossing.

## 2024-10-01 NOTE — CONSULT LETTER
[Dear  ___] : Dear  [unfilled], [Consult Letter:] : I had the pleasure of evaluating your patient, [unfilled]. [Please see my note below.] : Please see my note below. [Consult Closing:] : Thank you very much for allowing me to participate in the care of this patient.  If you have any questions, please do not hesitate to contact me. [Sincerely,] : Sincerely, [FreeTextEntry3] : Ruiz Kaufman MD, PhD, DPN-N\par  [facility]\par  Neurology at [place]\par  Chief, Division of Neurology\par  [facility]\par

## 2024-10-01 NOTE — PHYSICAL EXAM
[FreeTextEntry1] : Tinel's sign negative over the fibular head. [General Appearance - Alert] : alert [General Appearance - In No Acute Distress] : in no acute distress [General Appearance - Well-Appearing] : healthy appearing [Oriented To Time, Place, And Person] : oriented to person, place, and time [Impaired Insight] : insight and judgment were intact [Affect] : the affect was normal [Memory Recent] : recent memory was not impaired [Person] : oriented to person [Place] : oriented to place [Time] : oriented to time [Concentration Intact] : normal concentrating ability [Fluency] : fluency intact [Comprehension] : comprehension intact [Past History] : adequate knowledge of personal past history [Cranial Nerves Optic (II)] : visual acuity intact bilaterally,  visual fields full to confrontation, pupils equal round and reactive to light [Cranial Nerves Oculomotor (III)] : extraocular motion intact [Cranial Nerves Trigeminal (V)] : facial sensation intact symmetrically [Cranial Nerves Facial (VII)] : face symmetrical [Cranial Nerves Vestibulocochlear (VIII)] : hearing was intact bilaterally [Cranial Nerves Glossopharyngeal (IX)] : tongue and palate midline [Cranial Nerves Accessory (XI - Cranial And Spinal)] : head turning and shoulder shrug symmetric [Cranial Nerves Hypoglossal (XII)] : there was no tongue deviation with protrusion [Motor Tone] : muscle tone was normal in all four extremities [Motor Strength] : muscle strength was normal in all four extremities [No Muscle Atrophy] : normal bulk in all four extremities [Paresis Pronator Drift Right-Sided] : no pronator drift on the right [Paresis Pronator Drift Left-Sided] : no pronator drift on the left [Sensation Tactile Decrease] : light touch was intact [Sensation Pain / Temperature Decrease] : pain and temperature was intact [Romberg's Sign] : Romberg's sign was negtive [Abnormal Walk] : normal gait [Balance] : balance was intact [Past-pointing] : there was no past-pointing [Tremor] : no tremor present [Coordination - Dysmetria Impaired Finger-to-Nose Bilateral] : not present [Coordination - Dysmetria Impaired Heel-to-Shin Bilateral] : not present [2+] : Ankle jerk left 2+ [PERRL With Normal Accommodation] : pupils were equal in size, round, reactive to light, with normal accommodation [Extraocular Movements] : extraocular movements were intact [Full Visual Field] : full visual field

## 2024-10-01 NOTE — ASSESSMENT
[FreeTextEntry1] : Long history of concentration difficulties Neuropsychological evaluation has diagnosed him with ADHD, posttraumatic stress disorder, and depression He  is not currently interested in medication for ADHD as he does not want to take a stimulant He said he will start seeing a therapist  Recent onset of intermittent pains over the lateral aspect of the right leg below the knee No neurological deficit He will return for EMG and nerve conduction studies for further evaluation.

## 2024-11-26 ENCOUNTER — APPOINTMENT (OUTPATIENT)
Dept: UROLOGY | Facility: CLINIC | Age: 64
End: 2024-11-26
Payer: MEDICARE

## 2024-11-26 DIAGNOSIS — N40.1 BENIGN PROSTATIC HYPERPLASIA WITH LOWER URINARY TRACT SYMPMS: ICD-10-CM

## 2024-11-26 DIAGNOSIS — R32 UNSPECIFIED URINARY INCONTINENCE: ICD-10-CM

## 2024-11-26 LAB
APPEARANCE: CLEAR
BILIRUBIN URINE: NEGATIVE
BLOOD URINE: NEGATIVE
COLOR: YELLOW
GLUCOSE QUALITATIVE U: NEGATIVE
KETONES URINE: NEGATIVE
LEUKOCYTE ESTERASE URINE: NEGATIVE
NITRITE URINE: NEGATIVE
PH URINE: 5.5
PROTEIN URINE: NEGATIVE
SPECIFIC GRAVITY URINE: 1.02
UROBILINOGEN URINE: 0.2 (ref 0.2–?)

## 2024-11-26 PROCEDURE — 51728 CYSTOMETROGRAM W/VP: CPT

## 2024-11-26 PROCEDURE — 51798 US URINE CAPACITY MEASURE: CPT

## 2024-11-26 PROCEDURE — 51741 ELECTRO-UROFLOWMETRY FIRST: CPT

## 2024-11-26 PROCEDURE — 51784 ANAL/URINARY MUSCLE STUDY: CPT

## 2024-12-06 ENCOUNTER — APPOINTMENT (OUTPATIENT)
Dept: UROLOGY | Facility: CLINIC | Age: 64
End: 2024-12-06

## 2024-12-06 LAB
BILIRUB UR QL STRIP: NORMAL
CLARITY UR: CLEAR
COLLECTION METHOD: NORMAL
GLUCOSE UR-MCNC: NORMAL
HCG UR QL: 0.2 EU/DL
HGB UR QL STRIP.AUTO: NORMAL
KETONES UR-MCNC: NORMAL
LEUKOCYTE ESTERASE UR QL STRIP: NORMAL
NITRITE UR QL STRIP: NORMAL
PH UR STRIP: 5.5
PROT UR STRIP-MCNC: NORMAL
SP GR UR STRIP: 1.02

## 2024-12-06 PROCEDURE — 81003 URINALYSIS AUTO W/O SCOPE: CPT | Mod: QW

## 2024-12-06 PROCEDURE — 99213 OFFICE O/P EST LOW 20 MIN: CPT | Mod: 25

## 2024-12-06 RX ORDER — OXYBUTYNIN CHLORIDE 5 MG/1
5 TABLET, EXTENDED RELEASE ORAL
Qty: 30 | Refills: 5 | Status: ACTIVE | COMMUNITY
Start: 2024-12-06 | End: 1900-01-01

## 2024-12-12 ENCOUNTER — APPOINTMENT (OUTPATIENT)
Dept: NEUROLOGY | Facility: CLINIC | Age: 64
End: 2024-12-12
Payer: MEDICARE

## 2024-12-12 PROCEDURE — 95886 MUSC TEST DONE W/N TEST COMP: CPT

## 2024-12-12 PROCEDURE — 95908 NRV CNDJ TST 3-4 STUDIES: CPT

## 2024-12-17 ENCOUNTER — APPOINTMENT (OUTPATIENT)
Dept: PULMONOLOGY | Facility: CLINIC | Age: 64
End: 2024-12-17
Payer: MEDICARE

## 2024-12-17 VITALS
SYSTOLIC BLOOD PRESSURE: 120 MMHG | BODY MASS INDEX: 28.56 KG/M2 | WEIGHT: 204 LBS | DIASTOLIC BLOOD PRESSURE: 82 MMHG | HEIGHT: 71 IN

## 2024-12-17 VITALS — HEART RATE: 77 BPM | OXYGEN SATURATION: 98 % | RESPIRATION RATE: 16 BRPM

## 2024-12-17 DIAGNOSIS — R06.83 SNORING: ICD-10-CM

## 2024-12-17 DIAGNOSIS — G47.19 OTHER HYPERSOMNIA: ICD-10-CM

## 2024-12-17 DIAGNOSIS — G47.33 OBSTRUCTIVE SLEEP APNEA (ADULT) (PEDIATRIC): ICD-10-CM

## 2024-12-17 PROCEDURE — 99213 OFFICE O/P EST LOW 20 MIN: CPT

## 2024-12-24 ENCOUNTER — APPOINTMENT (OUTPATIENT)
Dept: UROLOGY | Facility: CLINIC | Age: 64
End: 2024-12-24
Payer: MEDICARE

## 2024-12-24 VITALS
HEIGHT: 71 IN | SYSTOLIC BLOOD PRESSURE: 134 MMHG | WEIGHT: 204 LBS | DIASTOLIC BLOOD PRESSURE: 82 MMHG | HEART RATE: 78 BPM | BODY MASS INDEX: 28.56 KG/M2

## 2024-12-24 VITALS
SYSTOLIC BLOOD PRESSURE: 134 MMHG | HEIGHT: 71 IN | HEART RATE: 78 BPM | DIASTOLIC BLOOD PRESSURE: 82 MMHG | BODY MASS INDEX: 28.56 KG/M2 | WEIGHT: 204 LBS

## 2024-12-24 DIAGNOSIS — N40.1 BENIGN PROSTATIC HYPERPLASIA WITH LOWER URINARY TRACT SYMPMS: ICD-10-CM

## 2024-12-24 DIAGNOSIS — R32 UNSPECIFIED URINARY INCONTINENCE: ICD-10-CM

## 2024-12-24 LAB
APPEARANCE: CLEAR
BILIRUBIN URINE: NEGATIVE
BLOOD URINE: NEGATIVE
COLOR: YELLOW
GLUCOSE QUALITATIVE U: NEGATIVE
KETONES URINE: NEGATIVE
LEUKOCYTE ESTERASE URINE: NEGATIVE
NITRITE URINE: NEGATIVE
PH URINE: 6
PROTEIN URINE: NEGATIVE
SPECIFIC GRAVITY URINE: >=1.03
UROBILINOGEN URINE: 0.2 (ref 0.2–?)

## 2024-12-24 PROCEDURE — 81003 URINALYSIS AUTO W/O SCOPE: CPT | Mod: QW

## 2024-12-24 PROCEDURE — 99213 OFFICE O/P EST LOW 20 MIN: CPT | Mod: 25

## 2025-01-10 ENCOUNTER — APPOINTMENT (OUTPATIENT)
Dept: UROLOGY | Facility: CLINIC | Age: 65
End: 2025-01-10

## 2025-01-15 NOTE — ED ADULT NURSE NOTE - NSIMPLEMENTINTERV_GEN_ALL_ED
Incoming refill request on patient's medication:    Medication: Outpatient Medication Detail     Disp Refills Start End    cloNIDine (CATAPRES) 0.2 MG tablet 270 tablet 0 10/18/2024 --    Sig: TAKE 1 TABLET BY MOUTH IN AM, 1 TAB AT NOON, 1 TABLET IN THE EVENING AS NEEDED FOR ANXIETY     Outpatient Medication Detail     Disp Refills Start End    PARoxetine (PAXIL) 40 MG tablet 90 tablet 0 10/18/2024 --    Sig: TAKE 1 TABLET BY MOUTH EVERY DAY      passed protocol.   Last office visit date: 12/6/23  Next appointment scheduled?: Yes   Number of refills given: 3 mo supply    Prescriber's Follow Up Recommendation:  Psychiatric Medications:  Hydroxyzine 50 mg tablet Take half a tablet to two tablets every 6 hours as needed for anxiety  Paxil 40 mg tablet Take 1 tablet by mouth every morning     No Show/Late Cancels in Last 12 Months: 7/1/24, 11/22/24     Next Visit Date: 1/20/2025    Verified dosage(s) against: Prescriber's last note and Medication list/Rx history     Preferred Pharmacy: [facility] #[id number] - [place], [place] - [address]    Action Taken: Refill approved; sent to pharmacy    
Implemented All Universal Safety Interventions:  Cadet to call system. Call bell, personal items and telephone within reach. Instruct patient to call for assistance. Room bathroom lighting operational. Non-slip footwear when patient is off stretcher. Physically safe environment: no spills, clutter or unnecessary equipment. Stretcher in lowest position, wheels locked, appropriate side rails in place.

## 2025-01-23 ENCOUNTER — APPOINTMENT (OUTPATIENT)
Dept: UROLOGY | Facility: CLINIC | Age: 65
End: 2025-01-23

## 2025-02-05 ENCOUNTER — EMERGENCY (EMERGENCY)
Facility: HOSPITAL | Age: 65
LOS: 1 days | Discharge: HOME | End: 2025-02-05
Attending: EMERGENCY MEDICINE
Payer: MEDICARE

## 2025-02-05 VITALS
DIASTOLIC BLOOD PRESSURE: 92 MMHG | OXYGEN SATURATION: 99 % | TEMPERATURE: 98 F | HEART RATE: 94 BPM | RESPIRATION RATE: 17 BRPM | WEIGHT: 200.18 LBS | SYSTOLIC BLOOD PRESSURE: 153 MMHG | HEIGHT: 71 IN

## 2025-02-05 VITALS
HEART RATE: 78 BPM | RESPIRATION RATE: 18 BRPM | SYSTOLIC BLOOD PRESSURE: 126 MMHG | OXYGEN SATURATION: 99 % | DIASTOLIC BLOOD PRESSURE: 74 MMHG

## 2025-02-05 DIAGNOSIS — S02.609A FRACTURE OF MANDIBLE, UNSPECIFIED, INITIAL ENCOUNTER FOR CLOSED FRACTURE: Chronic | ICD-10-CM

## 2025-02-05 DIAGNOSIS — Z98.89 OTHER SPECIFIED POSTPROCEDURAL STATES: Chronic | ICD-10-CM

## 2025-02-05 LAB
ALBUMIN SERPL ELPH-MCNC: 4.1 G/DL — SIGNIFICANT CHANGE UP (ref 3.3–5.2)
ALP SERPL-CCNC: 73 U/L — SIGNIFICANT CHANGE UP (ref 40–120)
ALT FLD-CCNC: 29 U/L — SIGNIFICANT CHANGE UP
ANION GAP SERPL CALC-SCNC: 13 MMOL/L — SIGNIFICANT CHANGE UP (ref 5–17)
ANISOCYTOSIS BLD QL: SLIGHT — SIGNIFICANT CHANGE UP
AST SERPL-CCNC: 23 U/L — SIGNIFICANT CHANGE UP
BASOPHILS # BLD AUTO: 0 K/UL — SIGNIFICANT CHANGE UP (ref 0–0.2)
BASOPHILS NFR BLD AUTO: 0 % — SIGNIFICANT CHANGE UP (ref 0–2)
BILIRUB SERPL-MCNC: 1 MG/DL — SIGNIFICANT CHANGE UP (ref 0.4–2)
BUN SERPL-MCNC: 13.1 MG/DL — SIGNIFICANT CHANGE UP (ref 8–20)
CALCIUM SERPL-MCNC: 9.2 MG/DL — SIGNIFICANT CHANGE UP (ref 8.4–10.5)
CHLORIDE SERPL-SCNC: 103 MMOL/L — SIGNIFICANT CHANGE UP (ref 96–108)
CO2 SERPL-SCNC: 25 MMOL/L — SIGNIFICANT CHANGE UP (ref 22–29)
CREAT SERPL-MCNC: 1.01 MG/DL — SIGNIFICANT CHANGE UP (ref 0.5–1.3)
EGFR: 82 ML/MIN/1.73M2 — SIGNIFICANT CHANGE UP
EOSINOPHIL # BLD AUTO: 0.14 K/UL — SIGNIFICANT CHANGE UP (ref 0–0.5)
EOSINOPHIL NFR BLD AUTO: 3.5 % — SIGNIFICANT CHANGE UP (ref 0–6)
FLUAV AG NPH QL: DETECTED
FLUBV AG NPH QL: SIGNIFICANT CHANGE UP
GIANT PLATELETS BLD QL SMEAR: PRESENT — SIGNIFICANT CHANGE UP
GLUCOSE SERPL-MCNC: 95 MG/DL — SIGNIFICANT CHANGE UP (ref 70–99)
HCT VFR BLD CALC: 43.4 % — SIGNIFICANT CHANGE UP (ref 39–50)
HGB BLD-MCNC: 13.5 G/DL — SIGNIFICANT CHANGE UP (ref 13–17)
LYMPHOCYTES # BLD AUTO: 0.17 K/UL — LOW (ref 1–3.3)
LYMPHOCYTES # BLD AUTO: 4.3 % — LOW (ref 13–44)
MACROCYTES BLD QL: SLIGHT — SIGNIFICANT CHANGE UP
MAGNESIUM SERPL-MCNC: 1.9 MG/DL — SIGNIFICANT CHANGE UP (ref 1.8–2.6)
MANUAL SMEAR VERIFICATION: SIGNIFICANT CHANGE UP
MCHC RBC-ENTMCNC: 22.6 PG — LOW (ref 27–34)
MCHC RBC-ENTMCNC: 31.1 G/DL — LOW (ref 32–36)
MCV RBC AUTO: 72.6 FL — LOW (ref 80–100)
METAMYELOCYTES # FLD: 0.9 % — HIGH (ref 0–0)
METAMYELOCYTES NFR BLD: 0.9 % — HIGH (ref 0–0)
MICROCYTES BLD QL: SLIGHT — SIGNIFICANT CHANGE UP
MONOCYTES # BLD AUTO: 0.21 K/UL — SIGNIFICANT CHANGE UP (ref 0–0.9)
MONOCYTES NFR BLD AUTO: 5.2 % — SIGNIFICANT CHANGE UP (ref 2–14)
NEUTROPHILS # BLD AUTO: 3.12 K/UL — SIGNIFICANT CHANGE UP (ref 1.8–7.4)
NEUTROPHILS NFR BLD AUTO: 69.6 % — SIGNIFICANT CHANGE UP (ref 43–77)
NEUTS BAND # BLD: 8.7 % — HIGH (ref 0–8)
NEUTS BAND NFR BLD: 8.7 % — HIGH (ref 0–8)
NT-PROBNP SERPL-SCNC: <36 PG/ML — SIGNIFICANT CHANGE UP (ref 0–300)
OVALOCYTES BLD QL SMEAR: SLIGHT — SIGNIFICANT CHANGE UP
PLAT MORPH BLD: NORMAL — SIGNIFICANT CHANGE UP
PLATELET # BLD AUTO: 146 K/UL — LOW (ref 150–400)
POIKILOCYTOSIS BLD QL AUTO: SLIGHT — SIGNIFICANT CHANGE UP
POTASSIUM SERPL-MCNC: 4.6 MMOL/L — SIGNIFICANT CHANGE UP (ref 3.5–5.3)
POTASSIUM SERPL-SCNC: 4.6 MMOL/L — SIGNIFICANT CHANGE UP (ref 3.5–5.3)
PROT SERPL-MCNC: 7.2 G/DL — SIGNIFICANT CHANGE UP (ref 6.6–8.7)
RBC # BLD: 5.98 M/UL — HIGH (ref 4.2–5.8)
RBC # FLD: 16.2 % — HIGH (ref 10.3–14.5)
RBC BLD AUTO: ABNORMAL
RSV RNA NPH QL NAA+NON-PROBE: SIGNIFICANT CHANGE UP
SARS-COV-2 RNA SPEC QL NAA+PROBE: SIGNIFICANT CHANGE UP
SODIUM SERPL-SCNC: 141 MMOL/L — SIGNIFICANT CHANGE UP (ref 135–145)
TROPONIN T, HIGH SENSITIVITY RESULT: 10 NG/L — SIGNIFICANT CHANGE UP (ref 0–51)
TROPONIN T, HIGH SENSITIVITY RESULT: <6 NG/L — SIGNIFICANT CHANGE UP (ref 0–51)
VARIANT LYMPHS # BLD: 7.8 % — HIGH (ref 0–6)
VARIANT LYMPHS NFR BLD MANUAL: 7.8 % — HIGH (ref 0–6)
WBC # BLD: 3.99 K/UL — SIGNIFICANT CHANGE UP (ref 3.8–10.5)
WBC # FLD AUTO: 3.99 K/UL — SIGNIFICANT CHANGE UP (ref 3.8–10.5)

## 2025-02-05 PROCEDURE — 71045 X-RAY EXAM CHEST 1 VIEW: CPT | Mod: 26

## 2025-02-05 PROCEDURE — 85025 COMPLETE CBC W/AUTO DIFF WBC: CPT

## 2025-02-05 PROCEDURE — 71045 X-RAY EXAM CHEST 1 VIEW: CPT

## 2025-02-05 PROCEDURE — 84484 ASSAY OF TROPONIN QUANT: CPT

## 2025-02-05 PROCEDURE — 83735 ASSAY OF MAGNESIUM: CPT

## 2025-02-05 PROCEDURE — 0241U: CPT

## 2025-02-05 PROCEDURE — 96360 HYDRATION IV INFUSION INIT: CPT

## 2025-02-05 PROCEDURE — 93010 ELECTROCARDIOGRAM REPORT: CPT

## 2025-02-05 PROCEDURE — 93005 ELECTROCARDIOGRAM TRACING: CPT

## 2025-02-05 PROCEDURE — 99285 EMERGENCY DEPT VISIT HI MDM: CPT | Mod: 25

## 2025-02-05 PROCEDURE — 83880 ASSAY OF NATRIURETIC PEPTIDE: CPT

## 2025-02-05 PROCEDURE — 99284 EMERGENCY DEPT VISIT MOD MDM: CPT

## 2025-02-05 PROCEDURE — 87637 SARSCOV2&INF A&B&RSV AMP PRB: CPT

## 2025-02-05 PROCEDURE — 36415 COLL VENOUS BLD VENIPUNCTURE: CPT

## 2025-02-05 PROCEDURE — 80053 COMPREHEN METABOLIC PANEL: CPT

## 2025-02-05 PROCEDURE — 96361 HYDRATE IV INFUSION ADD-ON: CPT

## 2025-02-05 RX ORDER — SODIUM CHLORIDE 9 G/ML
1000 INJECTION, SOLUTION INTRAVENOUS ONCE
Refills: 0 | Status: COMPLETED | OUTPATIENT
Start: 2025-02-05 | End: 2025-02-05

## 2025-02-05 RX ORDER — IBUPROFEN 600 MG/1
600 TABLET, FILM COATED ORAL ONCE
Refills: 0 | Status: COMPLETED | OUTPATIENT
Start: 2025-02-05 | End: 2025-02-05

## 2025-02-05 RX ORDER — OSELTAMIVIR PHOSPHATE 75 MG/1
75 CAPSULE ORAL ONCE
Refills: 0 | Status: COMPLETED | OUTPATIENT
Start: 2025-02-05 | End: 2025-02-05

## 2025-02-05 RX ORDER — OSELTAMIVIR PHOSPHATE 75 MG/1
1 CAPSULE ORAL
Qty: 10 | Refills: 0
Start: 2025-02-05 | End: 2025-02-09

## 2025-02-05 RX ADMIN — IBUPROFEN 600 MILLIGRAM(S): 600 TABLET, FILM COATED ORAL at 10:42

## 2025-02-05 RX ADMIN — SODIUM CHLORIDE 1000 MILLILITER(S): 9 INJECTION, SOLUTION INTRAVENOUS at 14:03

## 2025-02-05 RX ADMIN — SODIUM CHLORIDE 1000 MILLILITER(S): 9 INJECTION, SOLUTION INTRAVENOUS at 10:42

## 2025-02-05 RX ADMIN — OSELTAMIVIR PHOSPHATE 75 MILLIGRAM(S): 75 CAPSULE ORAL at 13:37

## 2025-02-05 RX ADMIN — IBUPROFEN 600 MILLIGRAM(S): 600 TABLET, FILM COATED ORAL at 12:50

## 2025-02-05 NOTE — ED PROVIDER NOTE - PHYSICAL EXAMINATION
General:     NAD  Head:     NC/AT, EOMI, oral mucosa moist  Neck:     trachea midline  Lungs:     CTA b/l, no w/r/r  CVS:     S1S2, RRR, no m/g/r  Abd:     +BS, s/nt/nd, no organomegaly  Ext: garcia x4

## 2025-02-05 NOTE — ED PROVIDER NOTE - NSFOLLOWUPINSTRUCTIONS_ED_ALL_ED_FT
You are advised to please follow up with your primary care doctor within the next 24 hours and return to the Emergency Department for worsening symptoms or any other concerns.  Your doctor may call [phone] to follow up on the specific results of the tests performed today in the emergency department.    YOU MAY TAKE TYLENOL 650MG Q4HRS AS NEEDED FOR FEVER/HEADACHE. YOU MAY TAKE MOTRIN 400MG EVERY 6 HOURS WITH FOOD AS NEEDED FOR FEVER/BODYACHES. YOU HAVE BEEN PRESCRIBED TAMIFLU.    INFLUENZA    Influenza is also called "the flu." It is an infection in the lungs, nose, and throat (respiratory tract). It is caused by a virus. The flu causes symptoms that are similar to symptoms of a cold. It also causes a high fever and body aches.  The flu spreads easily from person to person (is contagious). Getting a flu shot (influenza vaccination) every year is the best way to prevent the flu.    What are the causes?  This condition is caused by the influenza virus. You can get the virus by:  Breathing in droplets that are in the air from the cough or sneeze of a person who has the virus.Touching something that has the virus on it (is contaminated) and then touching your mouth, nose, or eyes.What increases the risk?  Certain things may make you more likely to get the flu. These include:  Not washing your hands often.Having close contact with many people during cold and flu season. Touching your mouth, eyes, or nose without first washing your hands.Not getting a flu shot every year.You may have a higher risk for the flu, along with serious problems such as a lung infection (pneumonia), if you:  Are older than 65. Are pregnant. Have a weakened disease-fighting system (immune system) because of a disease or taking certain medicines.Have a long-term (chronic) illness, such as:  Heart, kidney, or lung disease.Diabetes.Asthma.Have a liver disorder. Are very overweight (morbidly obese).Have anemia. This is a condition that affects your red blood cells. What are the signs or symptoms?  Symptoms usually begin suddenly and last 4–14 days. They may include:  Fever and chills. Headaches, body aches, or muscle aches. Sore throat. Cough. Runny or stuffy (congested) nose. Chest discomfort. Not wanting to eat as much as normal (poor appetite).Weakness or feeling tired (fatigue).Dizziness.Feeling sick to your stomach (nauseous) or throwing up (vomiting).How is this treated?  If the flu is found early, you can be treated with medicine that can help reduce how bad the illness is and how long it lasts (antiviral medicine). This may be given by mouth (orally) or through an IV tube.  Taking care of yourself at home can help your symptoms get better. Your doctor may suggest:  Taking over-the-counter medicines. Drinking plenty of fluids.The flu often goes away on its own. If you have very bad symptoms or other problems, you may be treated in a hospital.  Follow these instructions at home:   Activity   Rest as needed. Get plenty of sleep.Stay home from work or school as told by your doctor.   Do not leave home until you do not have a fever for 24 hours without taking medicine. Leave home only to visit your doctor.Eating and drinking   Take an ORS (oral rehydration solution). This is a drink that is sold at pharmacies and stores.Drink enough fluid to keep your pee (urine) pale yellow.Drink clear fluids in small amounts as you are able. Clear fluids include:  Water.Ice chips.Fruit juice that has water added (diluted fruit juice).Low-calorie sports drinks.Eat bland, easy-to-digest foods in small amounts as you are able. These foods include:  Bananas.Applesauce.Rice.Lean meats.Toast.Crackers.Do not eat or drink:  Fluids that have a lot of sugar or caffeine.Alcohol.Spicy or fatty foods.General instructions   Take over-the-counter and prescription medicines only as told by your doctor.Use a cool mist humidifier to add moisture to the air in your home. This can make it easier for you to breathe.Cover your mouth and nose when you cough or sneeze.Wash your hands with soap and water often, especially after you cough or sneeze. If you cannot use soap and water, use alcohol-based hand .Keep all follow-up visits as told by your doctor. This is important.How is this prevented?     Get a flu shot every year. You may get the flu shot in late summer, fall, or winter. Ask your doctor when you should get your flu shot.Avoid contact with people who are sick during fall and winter (cold and flu season).    Contact a doctor if:  You get new symptoms.  You have:  Chest pain.Watery poop (diarrhea).A fever.Your cough gets worse.You start to have more mucus.You feel sick to your stomach.You throw up.Get help right away if you:  Have shortness of breath.Have trouble breathing.Have skin or nails that turn a bluish color.Have very bad pain or stiffness in your neck.Get a sudden headache.Get sudden pain in your face or ear.Cannot eat or drink without throwing up.Summary  Influenza ("the flu") is an infection in the lungs, nose, and throat. It is caused by a virus.Take over-the-counter and prescription medicines only as told by your doctor.Getting a flu shot every year is the best way to avoid getting the flu.

## 2025-02-05 NOTE — ED PROVIDER NOTE - CLINICAL SUMMARY MEDICAL DECISION MAKING FREE TEXT BOX
64-year-old male; with PMH PE (over 10 years ago, no longer on AC), BPH, PTSD, thalassemia minor; now presenting with cough, generalized malaise and chest pain.  Patient reports over the past 3 to 4 days he began having a dry cough with progressively worsening generalized malaise.  Complaining of diffuse bodyaches.  Complaining of chest pain–substernal, intermittently with cough, associated with mild shortness of breath.  Denies palpitations.  Denies nausea or vomiting.  Complaining of mild lightheadedness. found to have influenza, would like tamiflu. will trial. recommended continued symptomatic/supportive care.

## 2025-02-05 NOTE — ED PROVIDER NOTE - PATIENT PORTAL LINK FT
You can access the FollowMyHealth Patient Portal offered by [facility] by registering at the following website: [email or web address]. By joining [facility]’s FollowMyHealth portal, you will also be able to view your health information using other applications (apps) compatible with our system.

## 2025-02-05 NOTE — ED PROVIDER NOTE - OBJECTIVE STATEMENT
64-year-old male; with PMH PE (over 10 years ago, no longer on AC), BPH, PTSD, thalassemia minor; now presenting with cough, generalized malaise and chest pain.  Patient reports over the past 3 to 4 days he began having a dry cough with progressively worsening generalized malaise.  Complaining of diffuse bodyaches.  Complaining of chest pain–substernal, intermittently with cough, associated with mild shortness of breath.  Denies palpitations.  Denies nausea or vomiting.  Complaining of mild lightheadedness.

## 2025-02-05 NOTE — ED ADULT TRIAGE NOTE - INTERNATIONAL TRAVEL
Procedure:  COLONOSCOPY    Relevant Problems   CARDIO   (+) CAD, multiple vessel   (+) Hyperlipidemia   (+) S/P CABG x 4        Physical Exam    Airway    Mallampati score: II  TM Distance: >3 FB  Neck ROM: full     Dental   No notable dental hx     Cardiovascular  Cardiovascular exam normal    Pulmonary  Pulmonary exam normal     Other Findings        Anesthesia Plan  ASA Score- 3     Anesthesia Type- IV sedation with anesthesia with ASA Monitors  Additional Monitors:   Airway Plan:           Plan Factors-Exercise tolerance (METS): >4 METS  Chart reviewed  Imaging results reviewed  Existing labs reviewed  Patient summary reviewed  Patient is not a current smoker  Induction-     Postoperative Plan-     Informed Consent- Anesthetic plan and risks discussed with patient  I personally reviewed this patient with the CRNA  Discussed and agreed on the Anesthesia Plan with the CRNA  Sherri Console No

## 2025-02-05 NOTE — ED ADULT NURSE NOTE - OBJECTIVE STATEMENT
Pt presents to ED with c./o of headache, sharp cough 7/10 pain, shortness of breath, throat pain, chest pressure, and chills x 3-4 days. Pt denies falls, n,v,d, respirations are even and unlabored, sp02 WNL. Pt states he took OTC medicine without any improvements. EKG done on arrival, reflective in chart. Pt is A&Ox3, speech is even and clear. MD at bedside.

## 2025-02-05 NOTE — ED PROVIDER NOTE - NS ED ROS FT
Constitutional: (+) fever  (+)chills  (-)sweats  Eyes/ENT: (-)   Cardiovascular: (+) chest pain, (-) palpitations (-) edema   Respiratory: (+) cough, (+) shortness of breath   Gastrointestinal: (-)nausea  (-)vomiting, (-) diarrhea  (-) abdominal pain   :  (-)dysuria, (-)frequency, (-)urgency, (-)hematuria  Musculoskeletal: (-) neck pain, (-) back pain, (-) joint pain  Integumentary: (-) rash, (-) edema  Neurological: (-) headache, (-) altered mental status  (-)LOC

## 2025-02-07 NOTE — ED STATDOCS - CLINICAL SUMMARY MEDICAL DECISION MAKING FREE TEXT BOX
pt well appearing with 2 days of LLQ abdominal pain associated with loose stool and generalized fatigue. Will get labs, CT, to evaluate for diverticulitis vs other infectious etiology and re-assess. 61 y/o male with no PMHx presents to the ED with 2 days of abdominal pain, LLQ, associated with bloating and loose stool. No blood in stool. Also for the last few days with sneezing, congestion, and subjective fever. Otherwise denies CP, SOB, cough, back pain, testicular pain, dysuria. Reports for the last few days more fatigue than usual. Denies sick contacts. Denies any surgical Hx.  PE: abd tender in LLQ  AP: pt well appearing with 2 days of LLQ abdominal pain associated with loose stool and generalized fatigue. Will get labs, CT, to evaluate for diverticulitis vs other infectious etiology and re-assess. 63 y/o male with no PMHx presents to the ED with 2 days of abdominal pain, LLQ, associated with bloating and loose stool. No blood in stool. Also for the last few days with sneezing, congestion, and subjective fever. Otherwise denies CP, SOB, cough, back pain, testicular pain, dysuria. Reports for the last few days more fatigue than usual. Denies sick contacts. Denies any surgical Hx.  PE: abd tender in LLQ  AP: pt well appearing with 2 days of LLQ abdominal pain associated with loose stool and generalized fatigue. Will get labs, CT, to evaluate for diverticulitis vs other infectious etiology and re-assess.    Ernst YEBOAH @ 21:21-- 62M presenting with abd pain x 2 days. Pts labs reviewed-- wnl. CT scan shows gastritis with possible colitis. D/w Dr. Snowden -- will treat colitis with augmentin and GI f/u. Pt also given Urology for enlarged prostate. No

## 2025-04-03 ENCOUNTER — APPOINTMENT (OUTPATIENT)
Dept: PULMONOLOGY | Facility: CLINIC | Age: 65
End: 2025-04-03

## 2025-09-20 ENCOUNTER — EMERGENCY (EMERGENCY)
Facility: HOSPITAL | Age: 65
LOS: 0 days | Discharge: HOME | End: 2025-09-20
Attending: STUDENT IN AN ORGANIZED HEALTH CARE EDUCATION/TRAINING PROGRAM
Payer: MEDICARE

## 2025-09-20 VITALS
TEMPERATURE: 98 F | WEIGHT: 199.96 LBS | RESPIRATION RATE: 18 BRPM | HEIGHT: 71 IN | OXYGEN SATURATION: 93 % | DIASTOLIC BLOOD PRESSURE: 91 MMHG | HEART RATE: 96 BPM | SYSTOLIC BLOOD PRESSURE: 118 MMHG

## 2025-09-20 VITALS
TEMPERATURE: 99 F | HEART RATE: 75 BPM | DIASTOLIC BLOOD PRESSURE: 67 MMHG | SYSTOLIC BLOOD PRESSURE: 108 MMHG | OXYGEN SATURATION: 100 % | RESPIRATION RATE: 18 BRPM
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
## 2025-09-20 DIAGNOSIS — Z98.89 OTHER SPECIFIED POSTPROCEDURAL STATES: Chronic | ICD-10-CM

## 2025-09-20 DIAGNOSIS — Z91.018 ALLERGY TO OTHER FOODS: ICD-10-CM

## 2025-09-20 DIAGNOSIS — S80.211A ABRASION, RIGHT KNEE, INITIAL ENCOUNTER: ICD-10-CM

## 2025-09-20 DIAGNOSIS — S02.609A FRACTURE OF MANDIBLE, UNSPECIFIED, INITIAL ENCOUNTER FOR CLOSED FRACTURE: Chronic | ICD-10-CM

## 2025-09-20 DIAGNOSIS — Z91.010 ALLERGY TO PEANUTS: ICD-10-CM

## 2025-09-20 DIAGNOSIS — M25.561 PAIN IN RIGHT KNEE: ICD-10-CM
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
## 2025-09-20 DIAGNOSIS — M25.571 PAIN IN RIGHT ANKLE AND JOINTS OF RIGHT FOOT: ICD-10-CM

## 2025-09-20 LAB
ADD ON TEST-SPECIMEN IN LAB: SIGNIFICANT CHANGE UP
ALBUMIN SERPL ELPH-MCNC: 4.4 G/DL — SIGNIFICANT CHANGE UP (ref 3.5–5.2)
ALP SERPL-CCNC: 82 U/L — SIGNIFICANT CHANGE UP (ref 40–129)
ALT FLD-CCNC: 27 U/L — SIGNIFICANT CHANGE UP (ref 10–50)
ANION GAP SERPL CALC-SCNC: 10 MMOL/L — SIGNIFICANT CHANGE UP (ref 5–17)
ANION GAP SERPL CALC-SCNC: 10 MMOL/L — SIGNIFICANT CHANGE UP (ref 5–17)
APTT BLD: 31 SEC — SIGNIFICANT CHANGE UP (ref 26.1–36.8)
AST SERPL-CCNC: 25 U/L — SIGNIFICANT CHANGE UP (ref 10–50)
BILIRUB SERPL-MCNC: 1.1 MG/DL — SIGNIFICANT CHANGE UP (ref 0–1.2)
BLD GP AB SCN SERPL QL: SIGNIFICANT CHANGE UP
BUN SERPL-MCNC: 11.6 MG/DL — SIGNIFICANT CHANGE UP (ref 8–23)
BUN SERPL-MCNC: 11.6 MG/DL — SIGNIFICANT CHANGE UP (ref 8–23)
CALCIUM SERPL-MCNC: 9.3 MG/DL — SIGNIFICANT CHANGE UP (ref 8.4–10.5)
CALCIUM SERPL-MCNC: 9.3 MG/DL — SIGNIFICANT CHANGE UP (ref 8.4–10.5)
CHLORIDE SERPL-SCNC: 103 MMOL/L — SIGNIFICANT CHANGE UP (ref 98–107)
CHLORIDE SERPL-SCNC: 103 MMOL/L — SIGNIFICANT CHANGE UP (ref 98–107)
CK SERPL-CCNC: 342 U/L — HIGH
CO2 SERPL-SCNC: 26 MMOL/L — SIGNIFICANT CHANGE UP (ref 22–29)
CO2 SERPL-SCNC: 26 MMOL/L — SIGNIFICANT CHANGE UP (ref 22–29)
CREAT SERPL-MCNC: 1.13 MG/DL — SIGNIFICANT CHANGE UP (ref 0.67–1.17)
CREAT SERPL-MCNC: 1.13 MG/DL — SIGNIFICANT CHANGE UP (ref 0.67–1.17)
EGFR: 71 ML/MIN/1.73M2 — SIGNIFICANT CHANGE UP
GLUCOSE SERPL-MCNC: 114 MG/DL — HIGH (ref 70–99)
GLUCOSE SERPL-MCNC: 114 MG/DL — HIGH (ref 70–99)
HCT VFR BLD CALC: 43.9 % — SIGNIFICANT CHANGE UP (ref 39–50)
HGB BLD-MCNC: 13.7 G/DL — SIGNIFICANT CHANGE UP (ref 13–17)
INR BLD: 1.1 RATIO — SIGNIFICANT CHANGE UP (ref 0.85–1.16)
MCHC RBC-ENTMCNC: 23 PG — LOW (ref 27–34)
MCHC RBC-ENTMCNC: 31.2 G/DL — LOW (ref 32–36)
MCV RBC AUTO: 73.8 FL — LOW (ref 80–100)
NRBC # BLD AUTO: 0 K/UL — SIGNIFICANT CHANGE UP (ref 0–0)
NRBC # FLD: 0 K/UL — SIGNIFICANT CHANGE UP (ref 0–0)
NRBC BLD AUTO-RTO: 0 /100 WBCS — SIGNIFICANT CHANGE UP (ref 0–0)
PLATELET # BLD AUTO: 178 K/UL — SIGNIFICANT CHANGE UP (ref 150–400)
PMV BLD: 11 FL — SIGNIFICANT CHANGE UP (ref 7–13)
POTASSIUM SERPL-MCNC: 3.7 MMOL/L — SIGNIFICANT CHANGE UP (ref 3.4–5.1)
POTASSIUM SERPL-MCNC: 3.7 MMOL/L — SIGNIFICANT CHANGE UP (ref 3.4–5.1)
POTASSIUM SERPL-SCNC: 3.7 MMOL/L — SIGNIFICANT CHANGE UP (ref 3.4–5.1)
POTASSIUM SERPL-SCNC: 3.7 MMOL/L — SIGNIFICANT CHANGE UP (ref 3.4–5.1)
PROT SERPL-MCNC: 7.2 G/DL — SIGNIFICANT CHANGE UP (ref 6.6–8.7)
PROTHROM AB SERPL-ACNC: 12.7 SEC — SIGNIFICANT CHANGE UP (ref 9.9–13.4)
RBC # BLD: 5.95 M/UL — HIGH (ref 4.2–5.8)
RBC # FLD: 15.7 % — HIGH (ref 10.3–14.5)
SODIUM SERPL-SCNC: 139 MMOL/L — SIGNIFICANT CHANGE UP (ref 136–145)
SODIUM SERPL-SCNC: 139 MMOL/L — SIGNIFICANT CHANGE UP (ref 136–145)
TROPONIN T, HIGH SENSITIVITY RESULT: 10 NG/L — SIGNIFICANT CHANGE UP
TROPONIN T, HIGH SENSITIVITY RESULT: 14 NG/L — SIGNIFICANT CHANGE UP
WBC # BLD: 3.83 K/UL — SIGNIFICANT CHANGE UP (ref 3.8–10.5)
WBC # FLD AUTO: 3.83 K/UL — SIGNIFICANT CHANGE UP (ref 3.8–10.5)

## 2025-09-20 PROCEDURE — 71045 X-RAY EXAM CHEST 1 VIEW: CPT | Mod: 26

## 2025-09-20 PROCEDURE — 80048 BASIC METABOLIC PNL TOTAL CA: CPT

## 2025-09-20 PROCEDURE — 73560 X-RAY EXAM OF KNEE 1 OR 2: CPT | Mod: RT

## 2025-09-20 PROCEDURE — 36415 COLL VENOUS BLD VENIPUNCTURE: CPT

## 2025-09-20 PROCEDURE — 73706 CT ANGIO LWR EXTR W/O&W/DYE: CPT | Mod: 26,RT

## 2025-09-20 PROCEDURE — 99285 EMERGENCY DEPT VISIT HI MDM: CPT

## 2025-09-20 PROCEDURE — 73551 X-RAY EXAM OF FEMUR 1: CPT | Mod: 26,RT

## 2025-09-20 PROCEDURE — 85610 PROTHROMBIN TIME: CPT

## 2025-09-20 PROCEDURE — 82550 ASSAY OF CK (CPK): CPT

## 2025-09-20 PROCEDURE — 73706 CT ANGIO LWR EXTR W/O&W/DYE: CPT | Mod: RT

## 2025-09-20 PROCEDURE — 84484 ASSAY OF TROPONIN QUANT: CPT

## 2025-09-20 PROCEDURE — 71260 CT THORAX DX C+: CPT

## 2025-09-20 PROCEDURE — 93005 ELECTROCARDIOGRAM TRACING: CPT

## 2025-09-20 PROCEDURE — 71260 CT THORAX DX C+: CPT | Mod: 26

## 2025-09-20 PROCEDURE — 73590 X-RAY EXAM OF LOWER LEG: CPT | Mod: RT

## 2025-09-20 PROCEDURE — 73600 X-RAY EXAM OF ANKLE: CPT | Mod: 26,RT

## 2025-09-20 PROCEDURE — 74177 CT ABD & PELVIS W/CONTRAST: CPT | Mod: 26

## 2025-09-20 PROCEDURE — 86901 BLOOD TYPING SEROLOGIC RH(D): CPT

## 2025-09-20 PROCEDURE — 80053 COMPREHEN METABOLIC PANEL: CPT

## 2025-09-20 PROCEDURE — 72125 CT NECK SPINE W/O DYE: CPT | Mod: 26

## 2025-09-20 PROCEDURE — 72125 CT NECK SPINE W/O DYE: CPT

## 2025-09-20 PROCEDURE — 73620 X-RAY EXAM OF FOOT: CPT | Mod: 26,RT

## 2025-09-20 PROCEDURE — 85730 THROMBOPLASTIN TIME PARTIAL: CPT

## 2025-09-20 PROCEDURE — 96374 THER/PROPH/DIAG INJ IV PUSH: CPT | Mod: XU

## 2025-09-20 PROCEDURE — 86850 RBC ANTIBODY SCREEN: CPT

## 2025-09-20 PROCEDURE — 73551 X-RAY EXAM OF FEMUR 1: CPT | Mod: RT

## 2025-09-20 PROCEDURE — 73590 X-RAY EXAM OF LOWER LEG: CPT | Mod: 26,RT

## 2025-09-20 PROCEDURE — 70450 CT HEAD/BRAIN W/O DYE: CPT

## 2025-09-20 PROCEDURE — 71045 X-RAY EXAM CHEST 1 VIEW: CPT

## 2025-09-20 PROCEDURE — 73560 X-RAY EXAM OF KNEE 1 OR 2: CPT | Mod: 26,RT

## 2025-09-20 PROCEDURE — 99285 EMERGENCY DEPT VISIT HI MDM: CPT | Mod: 25

## 2025-09-20 PROCEDURE — 70450 CT HEAD/BRAIN W/O DYE: CPT | Mod: 26

## 2025-09-20 PROCEDURE — 85027 COMPLETE CBC AUTOMATED: CPT

## 2025-09-20 PROCEDURE — 86900 BLOOD TYPING SEROLOGIC ABO: CPT

## 2025-09-20 PROCEDURE — 73600 X-RAY EXAM OF ANKLE: CPT | Mod: RT

## 2025-09-20 PROCEDURE — 73620 X-RAY EXAM OF FOOT: CPT | Mod: RT

## 2025-09-20 PROCEDURE — 82962 GLUCOSE BLOOD TEST: CPT

## 2025-09-20 PROCEDURE — 74177 CT ABD & PELVIS W/CONTRAST: CPT

## 2025-09-20 PROCEDURE — 93010 ELECTROCARDIOGRAM REPORT: CPT

## 2025-09-20 RX ORDER — ACETAMINOPHEN 500 MG/5ML
650 LIQUID (ML) ORAL ONCE
Refills: 0 | Status: COMPLETED | OUTPATIENT
Start: 2025-09-20 | End: 2025-09-20

## 2025-09-20 RX ADMIN — Medication 4 MILLIGRAM(S): at 14:51

## 2025-09-20 RX ADMIN — Medication 1000 MILLILITER(S): at 17:10

## 2025-09-20 RX ADMIN — Medication 650 MILLIGRAM(S): at 19:40

## 2025-09-24 PROBLEM — S93.491A SPRAIN OF OTHER LIGAMENT OF RIGHT ANKLE, INITIAL ENCOUNTER: Status: ACTIVE | Noted: 2025-09-24
